# Patient Record
Sex: FEMALE | Race: WHITE | NOT HISPANIC OR LATINO | Employment: UNEMPLOYED | URBAN - NONMETROPOLITAN AREA
[De-identification: names, ages, dates, MRNs, and addresses within clinical notes are randomized per-mention and may not be internally consistent; named-entity substitution may affect disease eponyms.]

---

## 2020-02-13 ENCOUNTER — OFFICE VISIT (OUTPATIENT)
Dept: BARIATRICS/WEIGHT MGMT | Facility: CLINIC | Age: 25
End: 2020-02-13

## 2020-02-13 ENCOUNTER — OFFICE VISIT (OUTPATIENT)
Dept: BARIATRICS/WEIGHT MGMT | Facility: HOSPITAL | Age: 25
End: 2020-02-13

## 2020-02-13 VITALS
BODY MASS INDEX: 44.82 KG/M2 | OXYGEN SATURATION: 98 % | HEIGHT: 65 IN | SYSTOLIC BLOOD PRESSURE: 119 MMHG | TEMPERATURE: 97.8 F | WEIGHT: 269 LBS | DIASTOLIC BLOOD PRESSURE: 76 MMHG | HEART RATE: 86 BPM

## 2020-02-13 DIAGNOSIS — I10 ESSENTIAL HYPERTENSION: ICD-10-CM

## 2020-02-13 DIAGNOSIS — E66.01 CLASS 3 SEVERE OBESITY DUE TO EXCESS CALORIES WITH SERIOUS COMORBIDITY AND BODY MASS INDEX (BMI) OF 40.0 TO 44.9 IN ADULT (HCC): Primary | ICD-10-CM

## 2020-02-13 PROBLEM — E66.813 CLASS 3 SEVERE OBESITY DUE TO EXCESS CALORIES WITH SERIOUS COMORBIDITY AND BODY MASS INDEX (BMI) OF 40.0 TO 44.9 IN ADULT: Status: ACTIVE | Noted: 2020-02-13

## 2020-02-13 PROCEDURE — 99204 OFFICE O/P NEW MOD 45 MIN: CPT | Performed by: SURGERY

## 2020-02-13 NOTE — PROGRESS NOTES
"Nutrition Services    Patient Name:  Katrin Friedman  YOB: 1995  MRN: [id number]  Admit Date:  (Not on file)    NUTRITION BARIATRIC/[facility] NOTE     Visit 1  Initial Assessment     Anthropometrics   Height: 65.25 in  Weight: 269 lbs  BMI: 44.4    Nutrition Recall  Eating _3_____ meals daily   Snacking-in between  Limited sweet intake  Drinking carbonated beverages- 5 to 7 Sprites per day  Drinking less than 64 fluid ounces-none    Education    Goal Setting and Information Packet  4 meal per day diet plan  4 meal per day sample menu  \"Perfect Protein, Fiber in Foods, and Reducing Fat\"  Reinforce Nutritional Needs for Surgery  Reinforce Nutritional Needs for [facility]    Nutrition Goals   Eat ___4__ meals per day with protein  Eat protein first at meals  Protein goal: 65gms   discussed protein guidelines for shakes and bar  Eliminate snacks  Healthier food choices  Portion control / Use smaller plate or measuring cup   Decrease soda intake to-5 or less per day this next month  Increase fluid intake to 64 ounces per day        Electronically signed by:  Annmarie Multani  02/13/20 2:33 PM   "

## 2020-02-13 NOTE — PROGRESS NOTES
Patient Care Team:  Umang Fuentes APRN as PCP - General (Nurse Practitioner)    Reason for Visit:  Surgical Weight loss    Subjective     Patient is a 24 y.o. female presents with morbid obesity and her Body mass index is 44.42 kg/m².     She is here for discussion of surgical weight loss options.  She stated she has been with the disease of obesity for year(s).  She stated she suffers from hypertension and morbid obesity due to her weight gain.  She stated that weight loss helps alleviate these symptoms.   She stated that she has tried multiple diet regimens including weight watchers, the Atkins diet, counting calories and medications such as phentermine to help with weight loss.  She stated that she has attempted these conservative methods for weight loss without maintaining long term success.  Today she would like to discuss surgical weight loss options such as the Laparoscopic Sleeve Gastrectomy or the Laparoscopic R - Y Gastric Bypass.     Review of Systems  General ROS: positive for  - weight gain  Psychological ROS: positive for - depression  Ophthalmic ROS: negative  ENT ROS: negative  Endocrine ROS: positive for - polydipsia/polyuria  Respiratory ROS: no cough, shortness of breath, or wheezing  Cardiovascular ROS: no chest pain or dyspnea on exertion  Gastrointestinal ROS: no abdominal pain, change in bowel habits, or black or bloody stools  Genito-Urinary ROS: no dysuria, trouble voiding, or hematuria  Musculoskeletal ROS: positive for - joint pain    History  Past Medical History:   Diagnosis Date   • Anxiety    • Hypertension    • Joint pain    • Migraines    • Pre-diabetes      Past Surgical History:   Procedure Laterality Date   •  SECTION  2017    x1   • HYSTERECTOMY  2018    open     Family History   Problem Relation Age of Onset   • Asthma Mother    • COPD Mother    • Heart disease Mother    • Hypertension Mother    • Stroke Mother    • Obesity Mother    • Diabetes Father    • Heart  disease Father    • Hypertension Father    • Stroke Father      Social History     Tobacco Use   • Smoking status: Current Every Day Smoker     Packs/day: 0.50   • Smokeless tobacco: Never Used   Substance Use Topics   • Alcohol use: Yes     Comment: once in a blue moon   • Drug use: Never       (Not in a hospital admission)  Allergies:  Patient has no known allergies.      Current Outpatient Medications:   •  busPIRone HCl (BUSPAR PO), Take  by mouth 3 (Three) Times a Day., Disp: , Rfl:   •  Calcium Carbonate-Vitamin D (CALCIUM 600+D PO), Take  by mouth., Disp: , Rfl:   •  LISINOPRIL PO, Take  by mouth., Disp: , Rfl:   •  Sertraline HCl (ZOLOFT PO), Take  by mouth 2 (Two) Times a Day., Disp: , Rfl:     Objective     Vital Signs  Temp:  [97.8 °F (36.6 °C)] 97.8 °F (36.6 °C)  Heart Rate:  [86] 86  BP: (119)/(76) 119/76  Body mass index is 44.42 kg/m².      02/13/20  1302   Weight: 122 kg (269 lb)       General Appearance:  awake, alert, oriented, in no acute distress  Lungs:  Normal expansion.  Clear to auscultation.  No rales, rhonchi, or wheezing.  Heart:  Heart sounds are normal.  Regular rate and rhythm without murmur, gallop or rub.  Abdomen:  Soft, non-tender, normal bowel sounds; no bruits, organomegaly or masses.  Abnormal shape: obese  Extremities: Extremities warm to touch, pink, with no edema.      Results Review:   None        Assessment/Plan   Encounter Diagnoses   Name Primary?   • Class 3 severe obesity due to excess calories with serious comorbidity and body mass index (BMI) of 40.0 to 44.9 in adult (CMS/HCC) Yes   • Essential hypertension        I believe this patient will be a good candidate for weight loss surgery.    She has chosen laparoscopic sleeve gastrectomy. I agree with this decision.  I have discussed the Dorinda - Y Gastric Bypass, laparoscopic sleeve gastrectomy and the Laparoscopic Gastric Band procedures to provide the alternatives which also includes non surgical weight loss options as  "well.  We discussed the benefits of the surgeries including the benefit of weight loss and the possible reversal of co-morbid conditions associated with morbid obesity. I explained to her that prior to making a definitive decision on the type of surgery she will require a study of the upper GI system.  I explained to her why the EGD is recommended.  She has been provided a structured dietary regimen based off of her behavior.  I discussed with the patient the etiology of the disease of obesity and the potential comorbid conditions associated with this disease.  She was instructed to follow the dietary regimen and follow-up with our program in 1 month's time with any additional questions as they may arise during this time.  We emphasized on focusing on proteins and meals high in fiber as well as adequate hydration that exceed 64 ounces of water daily.  The patient reports that her hypertension has remained stable on his current treatment regimen.  Anticipate improvement if not reversal of these comorbid conditions with reversal of her morbid obesity.  I explained that I anticipate the patient to lose 6 pounds prior to her next monthly visit.  I have also explained that they need to record or document when they are going to have the \"cheat day\".    I discussed the patient's findings and my recommendations with patient.     I have also recommended that she obtain completion of medically supervised weight loss program as well as psych and cardiac risk assessment prior to surgery consideration.    Dr. Charlie Escamilla MD FACS    02/13/20  1:46 PM  Patient Care Team:  Umang Fuentes APRN as PCP - General (Nurse Practitioner)    "

## 2020-03-11 ENCOUNTER — OFFICE VISIT (OUTPATIENT)
Dept: BARIATRICS/WEIGHT MGMT | Facility: CLINIC | Age: 25
End: 2020-03-11

## 2020-03-11 VITALS
TEMPERATURE: 98.1 F | OXYGEN SATURATION: 98 % | BODY MASS INDEX: 44.85 KG/M2 | HEIGHT: 65 IN | HEART RATE: 79 BPM | SYSTOLIC BLOOD PRESSURE: 111 MMHG | WEIGHT: 269.2 LBS | DIASTOLIC BLOOD PRESSURE: 78 MMHG

## 2020-03-11 DIAGNOSIS — E66.01 CLASS 3 SEVERE OBESITY DUE TO EXCESS CALORIES WITH SERIOUS COMORBIDITY AND BODY MASS INDEX (BMI) OF 40.0 TO 44.9 IN ADULT (HCC): Primary | ICD-10-CM

## 2020-03-11 DIAGNOSIS — F17.210 TOBACCO DEPENDENCE DUE TO CIGARETTES: ICD-10-CM

## 2020-03-11 DIAGNOSIS — Z71.6 ENCOUNTER FOR TOBACCO USE CESSATION COUNSELING: ICD-10-CM

## 2020-03-11 DIAGNOSIS — Z01.818 ENCOUNTER FOR OTHER PREPROCEDURAL EXAMINATION: ICD-10-CM

## 2020-03-11 DIAGNOSIS — I10 ESSENTIAL HYPERTENSION: ICD-10-CM

## 2020-03-11 PROCEDURE — 99406 BEHAV CHNG SMOKING 3-10 MIN: CPT | Performed by: NURSE PRACTITIONER

## 2020-03-11 PROCEDURE — 99214 OFFICE O/P EST MOD 30 MIN: CPT | Performed by: NURSE PRACTITIONER

## 2020-03-11 NOTE — PROGRESS NOTES
Patient Care Team:  Umang Fuentes APRN as PCP - General (Nurse Practitioner)    Reason for Visit: Surgical Weight Loss    Subjective        Katrin Friedman is a 24 y.o. year old female who is here for follow-up and continued medical management of morbid obesity. Her current Body mass index is 44.45 kg/m². She states she suffers from high blood pressure and morbid obesity due to weight gain. She is currently following the 4 meals/day diet prescription. Katrin Friedman previously agreed to incorporate the prescription as provided, while also increasing physical exercise. Patient states she has been successful at eating 3 meals/day, which is up from 1-2 meals, and getting adequate water intake. She has struggles with eating 4 meals/day and eating carbohydrates after lunches. She has been exercising by walking for 30 minutes twice a week. Katrin Friedman also states she has been drinking 64 ounces of water and getting 30 grams of protein intake per day. She has maintained weight since her last visit.    Review of Systems  Negative except the below listed  Musculoskeletal ROS: positive for - back pain    History  Past Medical History:   Diagnosis Date   • Anxiety    • Hypertension    • Joint pain    • Migraines    • Pre-diabetes      Past Surgical History:   Procedure Laterality Date   •  SECTION  2017    x1   • HYSTERECTOMY  2018    open     Family History   Problem Relation Age of Onset   • Asthma Mother    • COPD Mother    • Heart disease Mother    • Hypertension Mother    • Stroke Mother    • Obesity Mother    • Diabetes Father    • Heart disease Father    • Hypertension Father    • Stroke Father      Social History     Tobacco Use   • Smoking status: Current Every Day Smoker     Packs/day: 0.50   • Smokeless tobacco: Never Used   Substance Use Topics   • Alcohol use: Yes     Comment: once in a blue moon   • Drug use: Never       (Not in a hospital admission)  Allergies:  Patient has no known  allergies.      Current Outpatient Medications:   •  busPIRone HCl (BUSPAR PO), Take  by mouth 3 (Three) Times a Day., Disp: , Rfl:   •  Calcium Carbonate-Vitamin D (CALCIUM 600+D PO), Take  by mouth., Disp: , Rfl:   •  LISINOPRIL PO, Take  by mouth., Disp: , Rfl:   •  Sertraline HCl (ZOLOFT PO), Take  by mouth 2 (Two) Times a Day., Disp: , Rfl:     Objective     Vital Signs  Temp:  [98.1 °F (36.7 °C)] 98.1 °F (36.7 °C)  Heart Rate:  [79] 79  BP: (111)/(78) 111/78  Body mass index is 44.45 kg/m².      03/11/20  1327   Weight: 122 kg (269 lb 3.2 oz)       Physical Exam:  HEENT: extra ocular movement intact  Respiratory:appears well, vitals normal, no respiratory distress, acyanotic, normal RR, chest clear, no wheezing, crepitations, rhonchi, normal symmetric air entry  Cardiovascular: regular rate and rhythm  GI: Soft, non-tender, normal bowel sounds; no bruits, organomegaly or masses. Abnormal shape: Obese  Musculoskeletal: inspection - no abnormality, range of motion normal  Neurologic: alert, oriented, normal speech, no focal findings or movement disorder noted       Results Review:   None        Assessment/Plan   Encounter Diagnoses   Name Primary?   • Class 3 severe obesity due to excess calories with serious comorbidity and body mass index (BMI) of 40.0 to 44.9 in adult (CMS/HCC) Yes   • Essential hypertension    • Encounter for other preprocedural examination    • Encounter for tobacco use cessation counseling    • Tobacco dependence due to cigarettes          1. Katrin Friedman was seen today for follow-up, obesity, nutrition counseling and weight loss. She has maintained weight since her last visit, making her Body mass index is 44.45 kg/m².. Today we discussed consistency with healthy changes in lifestyle, diet, and exercise for long term success. Katrin Friedman had received handouts to her explaining the recommendation on portion sizes/appetite control/reading nutrition labels. Intensive behavioral therapy for  obesity was done today as well.    Dr. Escamilla and I believe this patient will be a good candidate for weight loss surgery.  He has discussed the Dorinda - Y Gastric Bypass, laparoscopic sleeve gastrectomy and the Laparoscopic Gastric Band procedures with the patient.  We discussed the benefits of the surgeries including the benefit of weight loss and the possible reversal of co-morbid conditions associated with morbid obesity. We have explained to the patient that prior to making a definitive decision on the type of surgery she will require an esophagogastroduodenoscopy with biopsies to assess for any contraindications for surgical weight loss.  The alternatives  include not doing anything, or pursuing an UGI series which only offers a diagnosis with potential less accuracy compared to EGD. The benefits of the EGD such as identifying the pathology and anatomy of the upper GI system and the complications and risks of the procedure.    The risk of the endoscopy were discussed in detail. We discussed the risk of perforation (one out of 3195-4002, riskier with dilation), bleeding (one out of 500), and the rare risks of infection, adverse reaction to anesthesia, respiratory failure, cardiac failure including MI and adverse reaction to medications, etc. We discussed consequences that could occur if a risk were to develop such as the need for hospitalization, blood transfusion, surgical intervention, medications, pain, disability and death. The patient verbalizes understanding and agrees to proceed. such as bleeding, perforation, swallowing difficulties and gas bloat can occur after this procedure. Upon completion of our discussion and addressing and answering her questions to her satisfation, informed consent was obtained.  She will be scheduled accordingly for the esophagogastroduodenoscopy procedure.    Goals for this month are: follow the meal prescription as provided focusing on eating 4 meals/day with vegetables at every  meal, eliminating carbohydrates after lunch, and getting adequate water and protein intake. Patient encouraged to call with questions and/or struggles as they may arise prior to next scheduled appointment.    2. Current comorbid condition of hypertension associated with her morbid obesity is reported to be stable on her current treatment regimen and medications. We anticipate the comorbid condition to improve as we address her morbid obesity.    3. Pre-procedural Examination - EGD, EKG, and psychology evaluation ordered today.    4. Katrin Friedman  reports that she has been smoking. She has been smoking about 0.50 packs per day. She has never used smokeless tobacco.. I have educated her on the risk of diseases from using tobacco products such as cancer, COPD and heart diease. I advised her to quit and she is willing to quit. We have discussed the following method/s for tobacco cessation:  Education Material Counseling.  Together we have set a quit date for 2 months.  She will follow up with me in 1 month or sooner to check on her progress. I spent 4 minutes counseling the patient.    Follow up in 1 month for a weight recheck.    STEFANIE Rivas    03/11/20  14:13  Patient Care Team:  Umang Fuentes APRN as PCP - General (Nurse Practitioner)

## 2020-03-12 ENCOUNTER — TELEPHONE (OUTPATIENT)
Dept: BARIATRICS/WEIGHT MGMT | Facility: CLINIC | Age: 25
End: 2020-03-12

## 2020-03-12 NOTE — TELEPHONE ENCOUNTER
Left message for patient to call me back. I was going to move her appointment from April 8 to April 10 when the new patient boot camp is so she didn't have to make two trips.

## 2020-04-03 ENCOUNTER — TELEPHONE (OUTPATIENT)
Dept: BARIATRICS/WEIGHT MGMT | Facility: CLINIC | Age: 25
End: 2020-04-03

## 2020-04-07 ENCOUNTER — TELEPHONE (OUTPATIENT)
Dept: BARIATRICS/WEIGHT MGMT | Facility: CLINIC | Age: 25
End: 2020-04-07

## 2020-04-08 ENCOUNTER — TELEPHONE (OUTPATIENT)
Dept: BARIATRICS/WEIGHT MGMT | Facility: CLINIC | Age: 25
End: 2020-04-08

## 2020-04-08 NOTE — TELEPHONE ENCOUNTER
The patients phone has calling restrictions on it now and I am unable to contact her. I sent her an email.

## 2020-04-15 ENCOUNTER — TELEPHONE (OUTPATIENT)
Dept: BARIATRICS/WEIGHT MGMT | Facility: CLINIC | Age: 25
End: 2020-04-15

## 2020-04-16 ENCOUNTER — TELEPHONE (OUTPATIENT)
Dept: BARIATRICS/WEIGHT MGMT | Facility: CLINIC | Age: 25
End: 2020-04-16

## 2020-04-16 NOTE — TELEPHONE ENCOUNTER
Attempted to reach patient multiple times for appointment today but was unsuccessful. We will continue to attempt to contact her and reschedule her appointment if necessary.    Donita Farfan, APRN

## 2020-04-23 ENCOUNTER — TELEMEDICINE (OUTPATIENT)
Dept: BARIATRICS/WEIGHT MGMT | Facility: CLINIC | Age: 25
End: 2020-04-23

## 2020-04-23 VITALS — BODY MASS INDEX: 44.55 KG/M2 | WEIGHT: 267.4 LBS | HEIGHT: 65 IN

## 2020-04-23 DIAGNOSIS — I10 ESSENTIAL HYPERTENSION: ICD-10-CM

## 2020-04-23 DIAGNOSIS — E66.01 CLASS 3 SEVERE OBESITY DUE TO EXCESS CALORIES WITH SERIOUS COMORBIDITY AND BODY MASS INDEX (BMI) OF 40.0 TO 44.9 IN ADULT (HCC): Primary | ICD-10-CM

## 2020-04-23 PROCEDURE — 99422 OL DIG E/M SVC 11-20 MIN: CPT | Performed by: NURSE PRACTITIONER

## 2020-04-23 NOTE — PROGRESS NOTES
Patient Care Team:  Umang Fuentes APRN as PCP - General (Nurse Practitioner)    Reason for Visit: Surgical Weight Loss    Type of Visit: Video Visit  Provider Location: [facility]  Patient Location: Home      Subjective        Katrin Friedman is a 24 y.o. year old female who is attending a video visit for follow-up and continued medical management of morbid obesity. Her current Body mass index is 44.16 kg/m². She states she suffers from high blood pressure and morbid obesity due to weight gain. She is currently following the 4 meals/day diet prescription. Katrin Friedman previously agreed to incorporate the prescription as provided, while also increasing physical exercise. Patient states she has been successful at eating 4 meals/day, getting vegetables with every meal, limiting carbohydrates after lunch, and getting adequate water intake. She struggles with cravings for sweets, which she curbs with fruit in the morning. She has been exercising by walking once a week for 40-45 minutes. Katrin Friedman also states she has been drinking 112+ ounces of water and getting 50-60 grams of protein intake per day. She has lost 2 lbs of weight since her last visit.    Review of Systems  Negative except the following  General ROS: positive for  - sleep disturbance    History  Past Medical History:   Diagnosis Date   • Anxiety    • Hypertension    • Joint pain    • Migraines    • Pre-diabetes      Past Surgical History:   Procedure Laterality Date   •  SECTION  2017    x1   • HYSTERECTOMY  2018    open     Family History   Problem Relation Age of Onset   • Asthma Mother    • COPD Mother    • Heart disease Mother    • Hypertension Mother    • Stroke Mother    • Obesity Mother    • Diabetes Father    • Heart disease Father    • Hypertension Father    • Stroke Father      Social History     Tobacco Use   • Smoking status: Current Every Day Smoker     Packs/day: 0.50   • Smokeless tobacco: Never Used   Substance  Use Topics   • Alcohol use: Yes     Comment: once in a blue moon   • Drug use: Never       (Not in a hospital admission)  Allergies:  Patient has no known allergies.      Current Outpatient Medications:   •  busPIRone HCl (BUSPAR PO), Take  by mouth 3 (Three) Times a Day., Disp: , Rfl:   •  Calcium Carbonate-Vitamin D (CALCIUM 600+D PO), Take  by mouth., Disp: , Rfl:   •  LISINOPRIL PO, Take  by mouth., Disp: , Rfl:   •  Sertraline HCl (ZOLOFT PO), Take  by mouth 2 (Two) Times a Day., Disp: , Rfl:     Objective     Vital Signs     Body mass index is 44.16 kg/m².      04/23/20  1342   Weight: 121 kg (267 lb 6.4 oz)       Physical Exam:  HEENT: extra ocular movement intact  Respiratory:appears well, no respiratory distress, acyanotic, normal RR  GI: Abnormal shape: obese  Musculoskeletal: range of motion normal  Neurologic: alert, oriented, normal speech, no focal findings or movement disorder noted       Results Review:   None        Assessment/Plan   Encounter Diagnoses   Name Primary?   • Class 3 severe obesity due to excess calories with serious comorbidity and body mass index (BMI) of 40.0 to 44.9 in adult (CMS/[facility]) Yes   • Essential hypertension          1. Katrin Friedman was seen today via video visit for follow-up, obesity, nutrition counseling and weight loss. She has lost 2 lbs of weight since her last visit, making her Body mass index is 44.16 kg/m².. Today we discussed consistency with healthy changes in lifestyle, diet, and exercise for long term success. Katrin Friedman had received handouts to her explaining the recommendation on portion sizes/appetite control/reading nutrition labels. Intensive behavioral therapy for obesity was done today as well.    Goals for this month are: continue to work towards following the meal prescription as provided focusing on eating 4 meals/day with vegetables at every meal, eliminating carbohydrates after lunch, and getting adequate water and protein intake. Patient  encouraged to call with questions and/or struggles as they may arise prior to next scheduled appointment.    2. Current comorbid condition of hypertension associated with her morbid obesity is reported to be stable on her current treatment regimen and medications. We anticipate the comorbid condition to improve as we address her morbid obesity.    A total of 15 minutes was spent face to face with this patient on video and over half of the time was spent on counseling and coordination of care for the disease of obesity. We specifically reviewed the dietary prescription and I made recommendations toward increasing exercise as tolerated as well as focusing on training their behavior toward storing less.    Follow up in 1 month for a weight recheck.    STEFANIE Rivas    04/23/20  14:43  Patient Care Team:  Umang Fuentes APRN as PCP - General (Nurse Practitioner)

## 2020-05-20 DIAGNOSIS — Z01.818 ENCOUNTER FOR OTHER PREPROCEDURAL EXAMINATION: Primary | ICD-10-CM

## 2020-05-20 DIAGNOSIS — E66.01 CLASS 3 SEVERE OBESITY DUE TO EXCESS CALORIES WITH SERIOUS COMORBIDITY AND BODY MASS INDEX (BMI) OF 40.0 TO 44.9 IN ADULT (HCC): ICD-10-CM

## 2020-05-21 ENCOUNTER — TELEMEDICINE (OUTPATIENT)
Dept: BARIATRICS/WEIGHT MGMT | Facility: CLINIC | Age: 25
End: 2020-05-21

## 2020-05-21 VITALS — BODY MASS INDEX: 44.48 KG/M2 | HEIGHT: 65 IN | WEIGHT: 267 LBS

## 2020-05-21 DIAGNOSIS — F17.210 TOBACCO DEPENDENCE DUE TO CIGARETTES: ICD-10-CM

## 2020-05-21 DIAGNOSIS — Z71.6 ENCOUNTER FOR TOBACCO USE CESSATION COUNSELING: ICD-10-CM

## 2020-05-21 DIAGNOSIS — Z01.818 ENCOUNTER FOR OTHER PREPROCEDURAL EXAMINATION: ICD-10-CM

## 2020-05-21 DIAGNOSIS — I10 ESSENTIAL HYPERTENSION: ICD-10-CM

## 2020-05-21 DIAGNOSIS — E66.01 CLASS 3 SEVERE OBESITY DUE TO EXCESS CALORIES WITH SERIOUS COMORBIDITY AND BODY MASS INDEX (BMI) OF 40.0 TO 44.9 IN ADULT (HCC): Primary | ICD-10-CM

## 2020-05-21 PROCEDURE — 99422 OL DIG E/M SVC 11-20 MIN: CPT | Performed by: NURSE PRACTITIONER

## 2020-05-21 NOTE — PROGRESS NOTES
Patient Care Team:  Umang Feuntes APRN as PCP - General (Nurse Practitioner)    Reason for Visit: Surgical Weight Loss (Visit 4)    Type of Visit: Video Visit  Provider Location: [facility] [place] [facility]  Patient Location: Home    Subjective      Katrin Friedman is a 24 y.o. year old female who is attending a video visit for follow-up and continued medical management of morbid obesity. Her current Body mass index is 44.09 kg/m². She states she suffers from high blood pressure and morbid obesity due to weight gain. She is currently following the 4 meals/day diet prescription. Katrin Friedman previously agreed to incorporate the prescription as provided, while also increasing physical exercise. Patient states she has been successful at eating 4 meals/day but continues to struggles with cravings for sweets and admits to giving in at times as well as having carbohydrates after lunch. She has been exercising by walking once a week for 40-45 minutes. Katrin Friedman also states she has been drinking 64 ounces of water and getting 60 grams of protein intake per day. She has maintaining weight since her last visit.    Review of Systems   Constitutional: Negative.    HENT: Negative.    Respiratory: Negative.    Cardiovascular: Positive for leg swelling.   Gastrointestinal: Negative.    Endocrine: Negative.    Genitourinary: Negative.    Musculoskeletal: Negative.    Skin: Negative.    Neurological: Negative.    Hematological: Negative.    Psychiatric/Behavioral: Negative.      History  Past Medical History:   Diagnosis Date   • Anxiety    • Hypertension    • Joint pain    • Migraines    • Pre-diabetes      Past Surgical History:   Procedure Laterality Date   •  SECTION  2017    x1   • HYSTERECTOMY  2018    open     Family History   Problem Relation Age of Onset   • Asthma Mother    • COPD Mother    • Heart disease Mother    • Hypertension Mother    • Stroke Mother    • Obesity Mother    • Diabetes Father    • Heart  disease Father    • Hypertension Father    • Stroke Father      Social History     Tobacco Use   • Smoking status: Current Every Day Smoker     Packs/day: 0.50   • Smokeless tobacco: Never Used   Substance Use Topics   • Alcohol use: Yes     Comment: once in a blue moon   • Drug use: Never       (Not in a hospital admission)  Allergies:  Patient has no known allergies.      Current Outpatient Medications:   •  busPIRone HCl (BUSPAR PO), Take  by mouth 3 (Three) Times a Day., Disp: , Rfl:   •  Calcium Carbonate-Vitamin D (CALCIUM 600+D PO), Take  by mouth., Disp: , Rfl:   •  LISINOPRIL PO, Take  by mouth., Disp: , Rfl:   •  Sertraline HCl (ZOLOFT PO), Take  by mouth 2 (Two) Times a Day., Disp: , Rfl:     Objective     Vital Signs     Body mass index is 44.09 kg/m².      05/21/20  1303   Weight: 121 kg (267 lb)       Physical Exam:  HEENT: extra ocular movement intact  Respiratory:appears well, no respiratory distress, acyanotic, normal RR  GI: Abnormal shape: obese  Musculoskeletal: range of motion normal  Neurologic: alert, oriented, normal speech, no focal findings or movement disorder noted       Results Review:   None      Assessment/Plan   Encounter Diagnoses   Name Primary?   • Class 3 severe obesity due to excess calories with serious comorbidity and body mass index (BMI) of 40.0 to 44.9 in adult (CMS/HCC) Yes   • Essential hypertension    • Encounter for other preprocedural examination    • Tobacco dependence due to cigarettes    • Encounter for tobacco use cessation counseling          1. Katrin Friedman was seen today via video visit for follow-up, obesity, nutrition counseling and weight loss. She has maintained weight since her last visit, making her Body mass index is 44.09 kg/m².. Today we discussed consistency with healthy changes in lifestyle, diet, and exercise for long term success. Katrin Friedman had received handouts to her explaining the recommendation on portion sizes/appetite control/reading  nutrition labels. Intensive behavioral therapy for obesity was done today as well. She will also talking with our dietitian today.    Goals for this month are: get EKG, contact psychology office for appointment, and return to making changes towards following the meal prescription as provided focusing on eating 4 meals/day with vegetables at every meal, eliminating carbohydrates after lunch, and getting adequate water and protein intake. Buy frozen or canned if needed to help with affording food. Patient encouraged to call with questions and/or struggles as they may arise prior to next scheduled appointment.    2. Current comorbid condition of hypertension associated with her morbid obesity is reported to be stable on her current treatment regimen and medications. We anticipate the comorbid condition to improve as we address her morbid obesity.     3. Pre-procedural Examination - EKG ordered today, last order .    4. Katrin Friedman  reports that she has been smoking. She has been smoking about 0.50 packs per day. She has never used smokeless tobacco.. I have educated her on the risk of diseases from using tobacco products such as cancer, COPD and heart diease. I advised her to quit and she is willing to quit. We have discussed the following method/s for tobacco cessation:  Counseling OTC Cessation Products.  She has switched to vapeing to help decrease her nicotine intake gradually. Together we have set a quit date for 3 months.  She will follow up with me in 1 month or sooner to check on her progress. I spent 4 minutes counseling the patient.    A total of 18 minutes was spent face to face with this patient on video and over half of the time was spent on counseling and coordination of care for the disease of obesity. We specifically reviewed the dietary prescription and I made recommendations toward increasing exercise as tolerated as well as focusing on training their behavior toward storing less.    Pre-op  testing:    EGD - Ordered, but not yet completed  H. Pylori - Ordered, but not yet completed   H. Pylori Stool -  N/A    Psychological Evaluation - Ordered, but not yet completed    EKG - Ordered, but not yet completed    Cardiology - If EKG abnormal     TSH - Needed, but not yet ordered  Nicotine - Needed, but not yet ordered    Pulmonary Clearance - N/A   Drug Tests - N/A    Dietitian - Completed     Follow up in 1 month for a weight recheck.    STEFANIE Rivas    05/21/20  13:25  Patient Care Team:  Umang Fuentes APRN as PCP - General (Nurse Practitioner)

## 2020-05-27 PROBLEM — Z01.818 ENCOUNTER FOR OTHER PREPROCEDURAL EXAMINATION: Status: ACTIVE | Noted: 2020-05-27

## 2020-06-05 ENCOUNTER — TELEPHONE (OUTPATIENT)
Dept: BARIATRICS/WEIGHT MGMT | Facility: CLINIC | Age: 25
End: 2020-06-05

## 2020-06-05 DIAGNOSIS — Z71.6 ENCOUNTER FOR TOBACCO USE CESSATION COUNSELING: ICD-10-CM

## 2020-06-05 DIAGNOSIS — E66.01 CLASS 3 SEVERE OBESITY DUE TO EXCESS CALORIES WITH SERIOUS COMORBIDITY AND BODY MASS INDEX (BMI) OF 40.0 TO 44.9 IN ADULT (HCC): ICD-10-CM

## 2020-06-05 DIAGNOSIS — Z01.818 ENCOUNTER FOR OTHER PREPROCEDURAL EXAMINATION: Primary | ICD-10-CM

## 2020-06-05 NOTE — TELEPHONE ENCOUNTER
Patient called to say that she stopped smoking on June 1st and inquired about when she should get her nicotine test. I recommended sometime after July 1st. She was agreeable. STEFANIE Luther was notified and she will place the order.

## 2020-06-11 ENCOUNTER — TRANSCRIBE ORDERS (OUTPATIENT)
Dept: ADMINISTRATIVE | Facility: HOSPITAL | Age: 25
End: 2020-06-11

## 2020-06-11 DIAGNOSIS — Z01.818 PRE-OP TESTING: Primary | ICD-10-CM

## 2020-06-16 ENCOUNTER — LAB (OUTPATIENT)
Dept: LAB | Facility: HOSPITAL | Age: 25
End: 2020-06-16

## 2020-06-16 PROCEDURE — U0003 INFECTIOUS AGENT DETECTION BY NUCLEIC ACID (DNA OR RNA); SEVERE ACUTE RESPIRATORY SYNDROME CORONAVIRUS 2 (SARS-COV-2) (CORONAVIRUS DISEASE [COVID-19]), AMPLIFIED PROBE TECHNIQUE, MAKING USE OF HIGH THROUGHPUT TECHNOLOGIES AS DESCRIBED BY CMS-2020-01-R: HCPCS | Performed by: SURGERY

## 2020-06-17 ENCOUNTER — TELEPHONE (OUTPATIENT)
Dept: BARIATRICS/WEIGHT MGMT | Facility: CLINIC | Age: 25
End: 2020-06-17

## 2020-06-17 LAB
COVID LABCORP PRIORITY: NORMAL
SARS-COV-2 RNA RESP QL NAA+PROBE: NOT DETECTED

## 2020-06-17 NOTE — TELEPHONE ENCOUNTER
Patient notified of their EGD procedure with Dr Escamilla     BA Process-EGD Reminder/Instructions       Called to remind the patient of his procedure with Dr Escamilla tomorrow 06/19/2020 EGD.      Instructions:     1) Eat normal the day before your procedure until 8 p.m. in the evening.    2) Beginning at 8pm clear liquids only-no Red or Pink in Color   3) Nothing to eat or drink after midnight.  4) Bring a list of medications.   5) Advised that they must bring a  as that IV sedation is being used.

## 2020-06-19 ENCOUNTER — HOSPITAL ENCOUNTER (OUTPATIENT)
Facility: HOSPITAL | Age: 25
Setting detail: HOSPITAL OUTPATIENT SURGERY
Discharge: HOME | End: 2020-06-19
Attending: SURGERY | Admitting: SURGERY

## 2020-06-19 ENCOUNTER — ANESTHESIA EVENT (OUTPATIENT)
Dept: GASTROENTEROLOGY | Facility: HOSPITAL | Age: 25
End: 2020-06-19

## 2020-06-19 ENCOUNTER — ANESTHESIA (OUTPATIENT)
Dept: GASTROENTEROLOGY | Facility: HOSPITAL | Age: 25
End: 2020-06-19

## 2020-06-19 VITALS
WEIGHT: 268 LBS | DIASTOLIC BLOOD PRESSURE: 70 MMHG | OXYGEN SATURATION: 99 % | HEART RATE: 69 BPM | BODY MASS INDEX: 43.07 KG/M2 | HEIGHT: 66 IN | RESPIRATION RATE: 18 BRPM | SYSTOLIC BLOOD PRESSURE: 119 MMHG | TEMPERATURE: 97 F

## 2020-06-19 DIAGNOSIS — E66.01 CLASS 3 SEVERE OBESITY DUE TO EXCESS CALORIES WITH SERIOUS COMORBIDITY AND BODY MASS INDEX (BMI) OF 40.0 TO 44.9 IN ADULT (HCC): ICD-10-CM

## 2020-06-19 DIAGNOSIS — Z01.818 ENCOUNTER FOR OTHER PREPROCEDURAL EXAMINATION: ICD-10-CM

## 2020-06-19 PROBLEM — K20.90 ESOPHAGITIS: Status: ACTIVE | Noted: 2020-06-19

## 2020-06-19 PROCEDURE — 87081 CULTURE SCREEN ONLY: CPT | Performed by: SURGERY

## 2020-06-19 PROCEDURE — 88305 TISSUE EXAM BY PATHOLOGIST: CPT | Performed by: SURGERY

## 2020-06-19 PROCEDURE — 25010000002 PROPOFOL 10 MG/ML EMULSION: Performed by: NURSE ANESTHETIST, CERTIFIED REGISTERED

## 2020-06-19 PROCEDURE — 43239 EGD BIOPSY SINGLE/MULTIPLE: CPT | Performed by: SURGERY

## 2020-06-19 RX ORDER — SODIUM CHLORIDE 9 MG/ML
500 INJECTION, SOLUTION INTRAVENOUS CONTINUOUS PRN
Status: DISCONTINUED | OUTPATIENT
Start: 2020-06-19 | End: 2020-06-19 | Stop reason: HOSPADM

## 2020-06-19 RX ORDER — SODIUM CHLORIDE 0.9 % (FLUSH) 0.9 %
10 SYRINGE (ML) INJECTION AS NEEDED
Status: DISCONTINUED | OUTPATIENT
Start: 2020-06-19 | End: 2020-06-19 | Stop reason: HOSPADM

## 2020-06-19 RX ORDER — LIDOCAINE HYDROCHLORIDE 10 MG/ML
0.5 INJECTION, SOLUTION EPIDURAL; INFILTRATION; INTRACAUDAL; PERINEURAL ONCE AS NEEDED
Status: DISCONTINUED | OUTPATIENT
Start: 2020-06-19 | End: 2020-06-19 | Stop reason: HOSPADM

## 2020-06-19 RX ORDER — PROPOFOL 10 MG/ML
VIAL (ML) INTRAVENOUS AS NEEDED
Status: DISCONTINUED | OUTPATIENT
Start: 2020-06-19 | End: 2020-06-19 | Stop reason: SURG

## 2020-06-19 RX ORDER — OMEPRAZOLE 20 MG/1
20 CAPSULE, DELAYED RELEASE ORAL DAILY
Qty: 20 CAPSULE | Refills: 0 | Status: SHIPPED | OUTPATIENT
Start: 2020-06-19 | End: 2020-08-27 | Stop reason: SDUPTHER

## 2020-06-19 RX ADMIN — SODIUM CHLORIDE 500 ML: 9 INJECTION, SOLUTION INTRAVENOUS at 09:33

## 2020-06-19 RX ADMIN — PROPOFOL 50 MG: 10 INJECTION, EMULSION INTRAVENOUS at 09:58

## 2020-06-19 RX ADMIN — LIDOCAINE HYDROCHLORIDE 100 MG: 20 INJECTION, SOLUTION INTRAVENOUS at 09:55

## 2020-06-19 RX ADMIN — PROPOFOL 70 MG: 10 INJECTION, EMULSION INTRAVENOUS at 09:55

## 2020-06-19 RX ADMIN — PROPOFOL 50 MG: 10 INJECTION, EMULSION INTRAVENOUS at 09:56

## 2020-06-19 RX ADMIN — PROPOFOL 50 MG: 10 INJECTION, EMULSION INTRAVENOUS at 09:57

## 2020-06-19 NOTE — ANESTHESIA POSTPROCEDURE EVALUATION
Patient: Katrin Friedman    Procedure Summary     Date:  06/19/20 Room / Location:  [facility] ENDOSCOPY 6 / [facility] ENDOSCOPY    Anesthesia Start:  0952 Anesthesia Stop:  1002    Procedure:  ESOPHAGOGASTRODUODENOSCOPY WITH ANESTHESIA (N/A ) Diagnosis:       Class 3 severe obesity due to excess calories with serious comorbidity and body mass index (BMI) of 40.0 to 44.9 in adult (CMS/[facility])      Encounter for other preprocedural examination      (Class 3 severe obesity due to excess calories with serious comorbidity and body mass index (BMI) of 40.0 to 44.9 in adult (CMS/[facility]) [E66.01, Z68.41])      (Encounter for other preprocedural examination [Z01.818])    Surgeon:  Charlie Escamilla MD Provider:  Adam Escamilla CRNA    Anesthesia Type:  MAC ASA Status:  3          Anesthesia Type: MAC    Vitals  No vitals data found for the desired time range.          Post Anesthesia Care and Evaluation    Patient location during evaluation: PHASE II  Patient participation: complete - patient participated  Level of consciousness: awake  Pain score: 0  Pain management: adequate  Airway patency: patent  Anesthetic complications: No anesthetic complications  PONV Status: none  Cardiovascular status: acceptable  Respiratory status: acceptable  Hydration status: acceptable

## 2020-06-19 NOTE — ANESTHESIA PREPROCEDURE EVALUATION
Anesthesia Evaluation     Patient summary reviewed   no history of anesthetic complications:  NPO Solid Status: > 8 hours             Airway   Mallampati: II  TM distance: >3 FB  Neck ROM: full  Dental      Pulmonary    (+) a smoker Former,   Cardiovascular   Exercise tolerance: excellent (>7 METS)    (+) hypertension,       Neuro/Psych- negative ROS  GI/Hepatic/Renal/Endo    (+) morbid obesity,      Musculoskeletal     Abdominal    Substance History      OB/GYN          Other                        Anesthesia Plan    ASA 3     MAC       Anesthetic plan, all risks, benefits, and alternatives have been provided, discussed and informed consent has been obtained with: patient.

## 2020-06-20 LAB — UREASE TISS QL: NEGATIVE

## 2020-06-22 LAB
LAB AP CASE REPORT: NORMAL
PATH REPORT.FINAL DX SPEC: NORMAL
PATH REPORT.GROSS SPEC: NORMAL

## 2020-06-26 ENCOUNTER — LAB (OUTPATIENT)
Dept: LAB | Facility: HOSPITAL | Age: 25
End: 2020-06-26

## 2020-06-26 ENCOUNTER — HOSPITAL ENCOUNTER (OUTPATIENT)
Dept: CARDIOLOGY | Facility: HOSPITAL | Age: 25
Discharge: HOME | End: 2020-06-26
Admitting: NURSE PRACTITIONER

## 2020-06-26 DIAGNOSIS — E66.01 CLASS 3 SEVERE OBESITY DUE TO EXCESS CALORIES WITH SERIOUS COMORBIDITY AND BODY MASS INDEX (BMI) OF 40.0 TO 44.9 IN ADULT (HCC): ICD-10-CM

## 2020-06-26 DIAGNOSIS — Z01.818 ENCOUNTER FOR OTHER PREPROCEDURAL EXAMINATION: ICD-10-CM

## 2020-06-26 DIAGNOSIS — I10 ESSENTIAL HYPERTENSION: ICD-10-CM

## 2020-06-26 PROCEDURE — 36415 COLL VENOUS BLD VENIPUNCTURE: CPT

## 2020-06-26 PROCEDURE — 93005 ELECTROCARDIOGRAM TRACING: CPT | Performed by: NURSE PRACTITIONER

## 2020-06-26 PROCEDURE — 93010 ELECTROCARDIOGRAM REPORT: CPT | Performed by: INTERNAL MEDICINE

## 2020-06-26 PROCEDURE — 84443 ASSAY THYROID STIM HORMONE: CPT | Performed by: NURSE PRACTITIONER

## 2020-06-26 PROCEDURE — G0480 DRUG TEST DEF 1-7 CLASSES: HCPCS | Performed by: NURSE PRACTITIONER

## 2020-06-27 LAB — TSH SERPL DL<=0.05 MIU/L-ACNC: 1.18 UIU/ML (ref 0.27–4.2)

## 2020-06-29 ENCOUNTER — TELEPHONE (OUTPATIENT)
Dept: BARIATRICS/WEIGHT MGMT | Facility: CLINIC | Age: 25
End: 2020-06-29

## 2020-06-30 ENCOUNTER — TELEMEDICINE - AUDIO (OUTPATIENT)
Dept: BARIATRICS/WEIGHT MGMT | Facility: CLINIC | Age: 25
End: 2020-06-30

## 2020-06-30 VITALS — WEIGHT: 267.4 LBS | HEIGHT: 66 IN | BODY MASS INDEX: 42.97 KG/M2

## 2020-06-30 DIAGNOSIS — E66.01 CLASS 3 SEVERE OBESITY DUE TO EXCESS CALORIES WITH SERIOUS COMORBIDITY AND BODY MASS INDEX (BMI) OF 40.0 TO 44.9 IN ADULT (HCC): Primary | ICD-10-CM

## 2020-06-30 DIAGNOSIS — I10 ESSENTIAL HYPERTENSION: ICD-10-CM

## 2020-06-30 PROCEDURE — 99441 PR PHYS/QHP TELEPHONE EVALUATION 5-10 MIN: CPT | Performed by: NURSE PRACTITIONER

## 2020-06-30 NOTE — PROGRESS NOTES
Patient Care Team:  Umang Fuentes APRN as PCP - General (Nurse Practitioner)    Reason for Visit: Surgical Weight Loss (Visit 5)    Type of Visit: Telephone Visit  Provider Location: [facility] [place] [facility]  Patient Location: Home    Subjective      Katrin Friedman is a 25 y.o. year old female who is attending a telephone visit for follow-up and continued medical management of morbid obesity. Her current Body mass index is 43.16 kg/m². She states she suffers from high blood pressure and morbid obesity due to weight gain. She is currently following the 4 meals/day diet prescription. Katrin Friedman previously agreed to incorporate the prescription as provided, while also increasing physical exercise. Patient states she has been successful at eating 4 meals/day, getting vegetables with every meal, and limiting carbohydrate intake after lunch. She has continued to avoid sodas. She also states she stopped all nicotine use as of [place] first. She has not been exercising. Katrin Friedman also states she has been drinking 64 ounces of water and getting 60+ grams of protein intake per day. She has maintained weight since her last visit.    Review of Systems   Constitutional: Negative.    HENT: Negative.    Respiratory: Negative.    Cardiovascular: Negative.    Gastrointestinal: Negative.    Genitourinary: Negative.    Musculoskeletal: Negative.    Skin: Negative.    Neurological: Negative.    Hematological: Negative.    Psychiatric/Behavioral: Negative.      History  Past Medical History:   Diagnosis Date   • Anxiety    • Hypertension    • Joint pain    • Migraines    • Pre-diabetes      Past Surgical History:   Procedure Laterality Date   •  SECTION  2017    x1   • ENDOSCOPY N/A 2020    Procedure: ESOPHAGOGASTRODUODENOSCOPY WITH ANESTHESIA;  Surgeon: Charlie Escamilla MD;  Location: [facility] ENDOSCOPY;  Service: General;  Laterality: N/A;  pre op: pre op scope   post op: esohpagitis   PCP: Genie Caraballo MD   •  HYSTERECTOMY  2018    open     Family History   Problem Relation Age of Onset   • Asthma Mother    • COPD Mother    • Heart disease Mother    • Hypertension Mother    • Stroke Mother    • Obesity Mother    • Diabetes Father    • Heart disease Father    • Hypertension Father    • Stroke Father      Social History     Tobacco Use   • Smoking status: Former Smoker     Packs/day: 0.50     Last attempt to quit: 2020     Years since quittin.0   • Smokeless tobacco: Never Used   Substance Use Topics   • Alcohol use: Yes     Comment: once in a blue moon   • Drug use: Never       (Not in a hospital admission)  Allergies:  Patient has no known allergies.      Current Outpatient Medications:   •  omeprazole (priLOSEC) 20 MG capsule, Take 1 capsule by mouth Daily., Disp: 20 capsule, Rfl: 0  •  busPIRone HCl (BUSPAR PO), Take  by mouth 3 (Three) Times a Day., Disp: , Rfl:   •  Calcium Carbonate-Vitamin D (CALCIUM 600+D PO), Take  by mouth., Disp: , Rfl:   •  LISINOPRIL PO, Take  by mouth., Disp: , Rfl:   •  Sertraline HCl (ZOLOFT PO), Take  by mouth 2 (Two) Times a Day., Disp: , Rfl:     Objective     Vital Signs     Body mass index is 43.16 kg/m².      20  1350   Weight: 121 kg (267 lb 6.4 oz)       Physical Exam   Pulmonary/Chest: Effort normal.   Neurological: She is alert.   Psychiatric: She has a normal mood and affect.       Results Review:   None      Assessment/Plan   Encounter Diagnoses   Name Primary?   • Class 3 severe obesity due to excess calories with serious comorbidity and body mass index (BMI) of 40.0 to 44.9 in adult (CMS/HCC) Yes   • Essential hypertension          1. Katrin Friedman was seen today via telephone visit for follow-up, obesity, nutrition counseling and weight loss. She has maintained weight since her last visit, making her Body mass index is 43.16 kg/m².. Today we discussed consistency with healthy changes in lifestyle, diet, and exercise for long term success. Katrin Friedman had  received handouts to her explaining the recommendation on portion sizes/appetite control/reading nutrition labels. Intensive behavioral therapy for obesity was done today as well.     Goals for this month are: continue with making changes towards following the meal prescription as provided focusing on eating 4 meals/day with vegetables at every meal, eliminating carbohydrates after lunch, and getting adequate water and protein intake. Patient encouraged to call with questions and/or struggles as they may arise prior to next scheduled appointment.    2. Current comorbid condition of hypertension associated with her morbid obesity is reported to remain stable on her current treatment regimen and medications. We anticipate the comorbid condition to improve as we address her morbid obesity.     A total of 12 minutes was spent with this patient on the telephone and over half of the time was spent on counseling and coordination of care for the disease of obesity. We specifically reviewed the dietary prescription and I made recommendations toward increasing exercise as tolerated as well as focusing on training their behavior toward storing less.    Pre-op testing:    EGD - Normal  H. Pylori - Negative   H. Pylori Stool -  N/A    Psychological Evaluation - Ordered, but not yet completed    EKG - Normal    Cardiology - N/A     TSH - Normal  Nicotine - Ordered, but not yet completed    Pulmonary Clearance - N/A   Drug Tests - N/A    Dietitian - Completed     Follow up in 1 month, with Dr. Escamilla in office, for a weight recheck.    STEFANIE Rivas    06/30/20  13:56  Patient Care Team:  Umang Fuentes APRN as PCP - General (Nurse Practitioner)

## 2020-07-02 LAB
COTININE UR-MCNC: <1 NG/ML
NICOTINE SERPL-MCNC: <1 NG/ML

## 2020-07-23 ENCOUNTER — OFFICE VISIT (OUTPATIENT)
Dept: BARIATRICS/WEIGHT MGMT | Facility: CLINIC | Age: 25
End: 2020-07-23

## 2020-07-23 VITALS
HEART RATE: 85 BPM | SYSTOLIC BLOOD PRESSURE: 109 MMHG | HEIGHT: 65 IN | BODY MASS INDEX: 44.98 KG/M2 | DIASTOLIC BLOOD PRESSURE: 68 MMHG | OXYGEN SATURATION: 97 % | TEMPERATURE: 99.3 F | WEIGHT: 270 LBS

## 2020-07-23 DIAGNOSIS — K20.90 ESOPHAGITIS: ICD-10-CM

## 2020-07-23 DIAGNOSIS — I10 ESSENTIAL HYPERTENSION: ICD-10-CM

## 2020-07-23 DIAGNOSIS — F17.210 TOBACCO DEPENDENCE DUE TO CIGARETTES: Primary | ICD-10-CM

## 2020-07-23 DIAGNOSIS — E66.01 CLASS 3 SEVERE OBESITY DUE TO EXCESS CALORIES WITH SERIOUS COMORBIDITY AND BODY MASS INDEX (BMI) OF 40.0 TO 44.9 IN ADULT (HCC): ICD-10-CM

## 2020-07-23 PROCEDURE — 99213 OFFICE O/P EST LOW 20 MIN: CPT | Performed by: SURGERY

## 2020-07-23 NOTE — PROGRESS NOTES
Patient Care Team:  Umang Fuentes APRN as PCP - General (Nurse Practitioner)    Reason for Visit:  Surgical Weight loss    Subjective     Patient is a 25 y.o. female presents with morbid obesity and her Body mass index is 44.93 kg/m².     She is here for discussion of surgical weight loss options.  She stated she has been with the disease of obesity for year(s).  She stated she suffers from hypertension and morbid obesity due to her weight gain.  She stated that weight loss helps alleviate these symptoms.   She stated that she has tried multiple diets including completing a medically supervised weight loss program to help with weight loss.  She stated that she has attempted these conservative methods for weight loss without maintaining long term success.  Today she would like to discuss surgical weight loss options such as the Laparoscopic Sleeve Gastrectomy or the Laparoscopic R - Y Gastric Bypass.     Review of Systems  General ROS: positive for  - weight gain  Respiratory ROS: no cough, shortness of breath, or wheezing  Cardiovascular ROS: no chest pain or dyspnea on exertion  Gastrointestinal ROS: no abdominal pain, change in bowel habits, or black or bloody stools  Genito-Urinary ROS: no dysuria, trouble voiding, or hematuria    History  Past Medical History:   Diagnosis Date   • Anxiety    • Hypertension    • Joint pain    • Migraines    • Pre-diabetes      Past Surgical History:   Procedure Laterality Date   •  SECTION  2017    x1   • ENDOSCOPY N/A 2020    Procedure: ESOPHAGOGASTRODUODENOSCOPY WITH ANESTHESIA;  Surgeon: Charlie Escamilla MD;  Location: [facility] ENDOSCOPY;  Service: General;  Laterality: N/A;  pre op: pre op scope   post op: esohpagitis   PCP: Genie Caraballo MD   • HYSTERECTOMY  2018    open     Family History   Problem Relation Age of Onset   • Asthma Mother    • COPD Mother    • Heart disease Mother    • Hypertension Mother    • Stroke Mother    • Obesity Mother    •  Diabetes Father    • Heart disease Father    • Hypertension Father    • Stroke Father      Social History     Tobacco Use   • Smoking status: Former Smoker     Packs/day: 0.50     Last attempt to quit: 2020     Years since quittin.1   • Smokeless tobacco: Never Used   Substance Use Topics   • Alcohol use: Yes     Comment: once in a blue moon   • Drug use: Never       (Not in a hospital admission)  Allergies:  Patient has no known allergies.      Current Outpatient Medications:   •  omeprazole (priLOSEC) 20 MG capsule, Take 1 capsule by mouth Daily., Disp: 20 capsule, Rfl: 0  •  busPIRone HCl (BUSPAR PO), Take  by mouth 3 (Three) Times a Day., Disp: , Rfl:   •  Calcium Carbonate-Vitamin D (CALCIUM 600+D PO), Take  by mouth., Disp: , Rfl:   •  LISINOPRIL PO, Take  by mouth., Disp: , Rfl:   •  Sertraline HCl (ZOLOFT PO), Take  by mouth 2 (Two) Times a Day., Disp: , Rfl:     Objective     Vital Signs  Temp:  [99.3 °F (37.4 °C)] 99.3 °F (37.4 °C)  Heart Rate:  [85] 85  BP: (109)/(68) 109/68  Body mass index is 44.93 kg/m².      20  1447   Weight: 122 kg (270 lb)       General Appearance:  awake, alert, oriented, in no acute distress  Lungs:  Normal expansion.  Clear to auscultation.  No rales, rhonchi, or wheezing.  Heart:  Heart sounds are normal.  Regular rate and rhythm without murmur, gallop or rub.  Abdomen:  Soft, non-tender, normal bowel sounds; no bruits, organomegaly or masses.  Abnormal shape: obese  Extremities: Extremities warm to touch, pink, with no edema.      Results Review:   I reviewed the patient's new clinical results.        Assessment/Plan   Encounter Diagnoses   Name Primary?   • Tobacco dependence due to cigarettes Yes   • Class 3 severe obesity due to excess calories with serious comorbidity and body mass index (BMI) of 40.0 to 44.9 in adult (CMS/[facility])    • Essential hypertension    • Esophagitis        I believe this patient will be a good candidate for weight loss surgery.    She  has chosen robotic assisted laparoscopic sleeve gastrectomy. I agree with this decision.  I have discussed the Dorinda - Y Gastric Bypass, laparoscopic sleeve gastrectomy and the Laparoscopic Gastric Band procedures to provide the alternatives which includes non surgical weight loss options as well.  We discussed the benefits of the surgeries including the benefit of weight loss and the possible reversal of co-morbid conditions associated with morbid obesity.  She is aware that the Sleeve procedure is not reversible.  We discussed the complications and risks which include the risk of perforation, leakage,bleeding, intra-abdominal organ injury, stenosis or ulcerations, the risk of venous thrombosis formation in the lungs, mesentery veins or lower extremities  leading to possible organ injury and death.  I explained to her the possibility that this procedure may not be performed laparoscopically and may require being converted to an opened procedure or aborted due to abnormal anatomy.  Also postoperatively there is a risk of increased GERD symptoms after this procedure.  I have explained if she develops intestinal metaplasia of her esophagus consideration for conversion to another weight loss procedure may be necessary.         Katrin Friedman understands the surgical procedures and the different surgical options that are available.  She understands the lifestyle changes that are required after surgery and has agreed to follow the guidelines outlined in the weight management program. Katrin Friedman also expressed understanding of the risks involved and had all of female questions answered and desires to proceed.    Upon completion of our discussion and addressing and answering her questions to her satisfaction, informed consent was obtained.   She will be scheduled accordingly for a laparoscopic Sleeve gastrectomy procedure.    The patient has stopped the use of nicotine products and has a negative nicotine study to  support her change.    I discussed the patient's findings and my recommendations with patient.     I have also recommended that she obtain psych evaluation prior to surgery consideration.    Dr. Charlie Escamilla MD FACS    07/23/20  17:01  Patient Care Team:  Umang Fuentes APRN as PCP - General (Nurse Practitioner)

## 2020-07-27 ENCOUNTER — TELEPHONE (OUTPATIENT)
Dept: BARIATRICS/WEIGHT MGMT | Facility: CLINIC | Age: 25
End: 2020-07-27

## 2020-08-26 ENCOUNTER — TELEPHONE (OUTPATIENT)
Dept: BARIATRICS/WEIGHT MGMT | Facility: CLINIC | Age: 25
End: 2020-08-26

## 2020-08-27 ENCOUNTER — OFFICE VISIT (OUTPATIENT)
Dept: BARIATRICS/WEIGHT MGMT | Facility: CLINIC | Age: 25
End: 2020-08-27

## 2020-08-27 VITALS
BODY MASS INDEX: 45.82 KG/M2 | HEART RATE: 94 BPM | WEIGHT: 275 LBS | DIASTOLIC BLOOD PRESSURE: 81 MMHG | TEMPERATURE: 99.6 F | SYSTOLIC BLOOD PRESSURE: 120 MMHG | HEIGHT: 65 IN | OXYGEN SATURATION: 97 %

## 2020-08-27 DIAGNOSIS — K20.90 ESOPHAGITIS: ICD-10-CM

## 2020-08-27 DIAGNOSIS — F17.210 TOBACCO DEPENDENCE DUE TO CIGARETTES: ICD-10-CM

## 2020-08-27 DIAGNOSIS — E66.01 CLASS 3 SEVERE OBESITY DUE TO EXCESS CALORIES WITH SERIOUS COMORBIDITY AND BODY MASS INDEX (BMI) OF 40.0 TO 44.9 IN ADULT (HCC): Primary | ICD-10-CM

## 2020-08-27 DIAGNOSIS — I10 ESSENTIAL HYPERTENSION: ICD-10-CM

## 2020-08-27 PROCEDURE — 99214 OFFICE O/P EST MOD 30 MIN: CPT | Performed by: SURGERY

## 2020-08-27 RX ORDER — OMEPRAZOLE 20 MG/1
20 CAPSULE, DELAYED RELEASE ORAL DAILY
Qty: 20 CAPSULE | Refills: 0 | Status: SHIPPED | OUTPATIENT
Start: 2020-08-27 | End: 2021-06-23

## 2020-08-27 RX ORDER — APREPITANT 40 MG/1
40 CAPSULE ORAL DAILY
Qty: 1 CAPSULE | Refills: 0 | Status: ON HOLD | OUTPATIENT
Start: 2020-08-27 | End: 2020-09-17

## 2020-08-27 RX ORDER — ONDANSETRON 2 MG/ML
4 INJECTION INTRAMUSCULAR; INTRAVENOUS EVERY 6 HOURS PRN
Status: CANCELLED | OUTPATIENT
Start: 2020-08-27

## 2020-08-27 NOTE — PROGRESS NOTES
Patient Care Team:  Umang Fuentes APRN as PCP - General (Nurse Practitioner)    Reason for Visit:  Surgical Weight loss    Subjective     Patient is a 25 y.o. female presents with morbid obesity and her Body mass index is 45.41 kg/m².     She is here for discussion of surgical weight loss options.  She stated she has been with the disease of obesity for year(s).  She stated she suffers from hypertension and morbid obesity due to her weight gain.  She stated that weight loss helps alleviate these symptoms.   She stated that she has tried multiple dietary regimens to help with weight loss.  She stated that she has attempted these conservative methods for weight loss without maintaining long term success.  Today she would like to discuss surgical weight loss options such as the Laparoscopic Sleeve Gastrectomy or the Laparoscopic R - Y Gastric Bypass.   On endoscopy the patient was found to have signs of esophagitis.  She is been on omeprazole and has felt better since.    Review of Systems  General ROS: negative  Psychological ROS: negative  Ophthalmic ROS: negative  ENT ROS: negative  Endocrine ROS: negative  Respiratory ROS: no cough, shortness of breath, or wheezing  Cardiovascular ROS: no chest pain or dyspnea on exertion  Gastrointestinal ROS: no abdominal pain, change in bowel habits, or black or bloody stools  Genito-Urinary ROS: no dysuria, trouble voiding, or hematuria  Musculoskeletal ROS: negative  Neurological ROS: no TIA or stroke symptoms    History  Past Medical History:   Diagnosis Date   • Anxiety    • Hypertension    • Joint pain    • Migraines    • Pre-diabetes      Past Surgical History:   Procedure Laterality Date   •  SECTION  2017    x1   • ENDOSCOPY N/A 2020    Procedure: ESOPHAGOGASTRODUODENOSCOPY WITH ANESTHESIA;  Surgeon: Charlie Escamilla MD;  Location: [facility] ENDOSCOPY;  Service: General;  Laterality: N/A;  pre op: pre op scope   post op: esohpagitis   PCP: Rashmi  MD Genie   • HYSTERECTOMY  2018    open     Family History   Problem Relation Age of Onset   • Asthma Mother    • COPD Mother    • Heart disease Mother    • Hypertension Mother    • Stroke Mother    • Obesity Mother    • Diabetes Father    • Heart disease Father    • Hypertension Father    • Stroke Father      Social History     Tobacco Use   • Smoking status: Former Smoker     Packs/day: 0.50     Last attempt to quit: 2020     Years since quittin.2   • Smokeless tobacco: Never Used   Substance Use Topics   • Alcohol use: Yes     Comment: once in a blue moon   • Drug use: Never       (Not in a hospital admission)  Allergies:  Patient has no known allergies.      Current Outpatient Medications:   •  busPIRone HCl (BUSPAR PO), Take  by mouth 3 (Three) Times a Day., Disp: , Rfl:   •  Calcium Carbonate-Vitamin D (CALCIUM 600+D PO), Take  by mouth., Disp: , Rfl:   •  LISINOPRIL PO, Take  by mouth., Disp: , Rfl:   •  omeprazole (priLOSEC) 20 MG capsule, Take 1 capsule by mouth Daily., Disp: 20 capsule, Rfl: 0  •  Sertraline HCl (ZOLOFT PO), Take  by mouth 2 (Two) Times a Day., Disp: , Rfl:     Objective     Vital Signs  Temp:  [99.6 °F (37.6 °C)] 99.6 °F (37.6 °C)  Heart Rate:  [94] 94  BP: (120)/(81) 120/81  Body mass index is 45.41 kg/m².      20  1045   Weight: 125 kg (275 lb)       General Appearance:  awake, alert, oriented, in no acute distress  Lungs:  Normal expansion.  Clear to auscultation.  No rales, rhonchi, or wheezing.  Heart:  Heart sounds are normal.  Regular rate and rhythm without murmur, gallop or rub.  Abdomen:  Soft, non-tender, normal bowel sounds; no bruits, organomegaly or masses.  Abnormal shape: obese  Extremities: Extremities warm to touch, pink, with no edema.      Results Review:   None        Assessment/Plan   Encounter Diagnoses   Name Primary?   • Class 3 severe obesity due to excess calories with serious comorbidity and body mass index (BMI) of 40.0 to 44.9 in adult (CMS/[facility])  Yes   • Essential hypertension    • Tobacco dependence due to cigarettes    • Esophagitis        I believe this patient will be a good candidate for weight loss surgery.    She has chosen the robotic assisted laparoscopic sleeve gastrectomy. I agree with this decision.  I have discussed the Dorinda - Y Gastric Bypass, laparoscopic sleeve gastrectomy and the Laparoscopic Gastric Band procedures to provide the alternatives which includes non surgical weight loss options as well.  We discussed the benefits of the surgeries including the benefit of weight loss and the possible reversal of co-morbid conditions associated with morbid obesity.  She is aware that the Sleeve procedure is not reversible.  We discussed the complications and risks which include the risk of perforation, leakage,bleeding, intra-abdominal organ injury, stenosis or ulcerations, the risk of venous thrombosis formation in the lungs, mesentery veins or lower extremities  leading to possible organ injury and death.  I explained to her the possibility that this procedure may not be performed laparoscopically and may require being converted to an opened procedure or aborted due to abnormal anatomy.  Also postoperatively there is a risk of increased GERD symptoms after this procedure.  I have explained if she develops intestinal metaplasia of her esophagus consideration for conversion to another weight loss procedure may be necessary.      Alfonso Report   As part of this patient's treatment plan I am prescribing controlled substances. The patient has been made aware of appropriate use of such medications, including potential risk of somnolence, limited ability to drive and /or work safely, and potential for dependence or overdose. It has also been made clear that these medications are for use by this patient only, without concomitant use of alcohol or other substances unless prescribed.    Katrin Friedman will be required to complete the educational program  "\"[employer]\" detailing terms of continued prescribing of controlled substances, including monitoring JADIEL reports, urine drug screening, and pill counts if necessary. Katrin Friedman is aware that inappropriate use will result in cessation of prescribing such medications.    JADIEL report has been reviewed.      History and physical exam exhibit continued safe and appropriate use of controlled substances.       Katrin Friedman understands the surgical procedures and the different surgical options that are available.  She understands the lifestyle changes that are required after surgery and has agreed to follow the guidelines outlined in the weight management program. Katrin Friedman also expressed understanding of the risks involved and had all of female questions answered and desires to proceed.    Upon completion of our discussion and addressing and answering her questions to her satisfaction, informed consent was obtained.   She will be scheduled accordingly for a laparoscopic Sleeve gastrectomy procedure.  The patient states that her symptoms of her esophagitis has improved since being placed on omeprazole.  Her blood pressures remained stable on his current treatment regimen.  I anticipate improvement of her her comorbid conditions associated with morbid obesity with reversal of her morbid obesity.    I discussed the patient's findings and my recommendations with patient.     I have also recommended that she obtain completion of her psych evaluation, cardiac risk assessment and preoperative laboratory work prior to surgery consideration.    Dr. Charlie Escamilla MD FACS    08/27/20  11:28  Patient Care Team:  Umang Fuentes APRN as PCP - General (Nurse Practitioner)    "

## 2020-09-01 PROBLEM — I10 ESSENTIAL HYPERTENSION: Status: ACTIVE | Noted: 2020-09-01

## 2020-09-09 ENCOUNTER — TELEPHONE (OUTPATIENT)
Dept: BARIATRICS/WEIGHT MGMT | Facility: CLINIC | Age: 25
End: 2020-09-09

## 2020-09-09 NOTE — TELEPHONE ENCOUNTER
Katrin called in to ask some questions regarding her surgery. I went on an informed her that I had just received her authorization from insurance and that her surgery is scheduled. Informed her to come to pre-work 9/10/20 at 12:15 pm. Also informed patient to arrive 9/17/20 at 6:30 am for surgery day. Patient was informed she would receive a call from hospital to schedule covid test.

## 2020-09-10 ENCOUNTER — APPOINTMENT (OUTPATIENT)
Dept: PREADMISSION TESTING | Facility: HOSPITAL | Age: 25
End: 2020-09-10

## 2020-09-10 ENCOUNTER — TRANSCRIBE ORDERS (OUTPATIENT)
Dept: ADMINISTRATIVE | Facility: HOSPITAL | Age: 25
End: 2020-09-10

## 2020-09-10 VITALS
HEART RATE: 69 BPM | RESPIRATION RATE: 18 BRPM | WEIGHT: 266.98 LBS | DIASTOLIC BLOOD PRESSURE: 73 MMHG | HEIGHT: 66 IN | SYSTOLIC BLOOD PRESSURE: 108 MMHG | BODY MASS INDEX: 42.91 KG/M2 | OXYGEN SATURATION: 98 %

## 2020-09-10 DIAGNOSIS — F17.210 TOBACCO DEPENDENCE DUE TO CIGARETTES: ICD-10-CM

## 2020-09-10 DIAGNOSIS — Z11.59 SCREENING FOR VIRAL DISEASE: Primary | ICD-10-CM

## 2020-09-10 DIAGNOSIS — E66.01 CLASS 3 SEVERE OBESITY DUE TO EXCESS CALORIES WITH SERIOUS COMORBIDITY AND BODY MASS INDEX (BMI) OF 40.0 TO 44.9 IN ADULT (HCC): ICD-10-CM

## 2020-09-10 LAB
ALBUMIN SERPL-MCNC: 4.8 G/DL (ref 3.5–5.2)
ALBUMIN/GLOB SERPL: 1.8 G/DL
ALP SERPL-CCNC: 70 U/L (ref 39–117)
ALT SERPL W P-5'-P-CCNC: 39 U/L (ref 1–33)
ANION GAP SERPL CALCULATED.3IONS-SCNC: 11 MMOL/L (ref 5–15)
APTT PPP: 32.3 SECONDS (ref 24.1–35)
AST SERPL-CCNC: 38 U/L (ref 1–32)
BILIRUB SERPL-MCNC: 0.4 MG/DL (ref 0–1.2)
BILIRUB UR QL STRIP: NEGATIVE
BUN SERPL-MCNC: 7 MG/DL (ref 6–20)
BUN/CREAT SERPL: 12.3 (ref 7–25)
CALCIUM SPEC-SCNC: 8.9 MG/DL (ref 8.6–10.5)
CHLORIDE SERPL-SCNC: 107 MMOL/L (ref 98–107)
CLARITY UR: CLEAR
CO2 SERPL-SCNC: 22 MMOL/L (ref 22–29)
COLOR UR: YELLOW
CREAT SERPL-MCNC: 0.57 MG/DL (ref 0.57–1)
DEPRECATED RDW RBC AUTO: 37.3 FL (ref 37–54)
ERYTHROCYTE [DISTWIDTH] IN BLOOD BY AUTOMATED COUNT: 11.6 % (ref 12.3–15.4)
GFR SERPL CREATININE-BSD FRML MDRD: 127 ML/MIN/1.73
GLOBULIN UR ELPH-MCNC: 2.6 GM/DL
GLUCOSE SERPL-MCNC: 94 MG/DL (ref 65–99)
GLUCOSE UR STRIP-MCNC: NEGATIVE MG/DL
HCT VFR BLD AUTO: 37.1 % (ref 34–46.6)
HGB BLD-MCNC: 12.6 G/DL (ref 12–15.9)
HGB UR QL STRIP.AUTO: NEGATIVE
INR PPP: 1.04 (ref 0.91–1.09)
KETONES UR QL STRIP: ABNORMAL
LEUKOCYTE ESTERASE UR QL STRIP.AUTO: NEGATIVE
MCH RBC QN AUTO: 29.9 PG (ref 26.6–33)
MCHC RBC AUTO-ENTMCNC: 34 G/DL (ref 31.5–35.7)
MCV RBC AUTO: 87.9 FL (ref 79–97)
NITRITE UR QL STRIP: NEGATIVE
PH UR STRIP.AUTO: 5.5 [PH] (ref 5–8)
PLATELET # BLD AUTO: 235 10*3/MM3 (ref 140–450)
PMV BLD AUTO: 10.4 FL (ref 6–12)
POTASSIUM SERPL-SCNC: 3.9 MMOL/L (ref 3.5–5.2)
PROT SERPL-MCNC: 7.4 G/DL (ref 6–8.5)
PROT UR QL STRIP: NEGATIVE
PROTHROMBIN TIME: 13.2 SECONDS (ref 11.9–14.6)
RBC # BLD AUTO: 4.22 10*6/MM3 (ref 3.77–5.28)
SODIUM SERPL-SCNC: 140 MMOL/L (ref 136–145)
SP GR UR STRIP: 1.01 (ref 1–1.03)
UROBILINOGEN UR QL STRIP: ABNORMAL
WBC # BLD AUTO: 6.22 10*3/MM3 (ref 3.4–10.8)

## 2020-09-10 PROCEDURE — 36415 COLL VENOUS BLD VENIPUNCTURE: CPT

## 2020-09-10 PROCEDURE — 85610 PROTHROMBIN TIME: CPT | Performed by: SURGERY

## 2020-09-10 PROCEDURE — 80053 COMPREHEN METABOLIC PANEL: CPT | Performed by: SURGERY

## 2020-09-10 PROCEDURE — G0480 DRUG TEST DEF 1-7 CLASSES: HCPCS | Performed by: SURGERY

## 2020-09-10 PROCEDURE — 85730 THROMBOPLASTIN TIME PARTIAL: CPT | Performed by: SURGERY

## 2020-09-10 PROCEDURE — 85027 COMPLETE CBC AUTOMATED: CPT | Performed by: SURGERY

## 2020-09-10 PROCEDURE — 81003 URINALYSIS AUTO W/O SCOPE: CPT | Performed by: SURGERY

## 2020-09-10 NOTE — DISCHARGE INSTRUCTIONS
DAY OF SURGERY INSTRUCTIONS        YOUR SURGEON: ***    PROCEDURE: ***    DATE OF SURGERY: ***    ARRIVAL TIME: AS DIRECTED BY OFFICE    YOU MAY TAKE THE FOLLOWING MEDICATION(S) THE MORNING OF SURGERY WITH A SIP OF WATER: ***      ALL OTHER HOME MEDICATIONS CHECK WITH YOUR DOCTOR                   MANAGING PAIN AFTER SURGERY    We know you are probably wondering what your pain will be like after surgery.  Following surgery it is unrealistic to expect you will not have pain.   Pain is how our bodies let us know that something is wrong or cautions us to be careful.  That said, our goal is to make your pain tolerable.    Methods we may use to treat your pain include (oral or IV medications, PCAs, epidurals, nerve blocks, etc.)   While some procedures require IV pain medications for a short time after surgery, transitioning to pain medications by mouth allows for better management of pain.   Your nurse will encourage you to take oral pain medications whenever possible.  IV medications work almost immediately, but only last a short while.  Taking medications by mouth allows for a more constant level of medication in your blood stream for a longer period of time.      Once your pain is out of control it is harder to get back under control.  It is important you are aware when your next dose of pain medication is due.  If you are admitted, your nurse may write the time of your next dose on the white board in your room to help you remember.      We are interested in your pain and encourage you to inform us about aggravating factors during your visit.   Many times a simple repositioning every few hours can make a big difference.    If your physician says it is okay, do not let your pain prevent you from getting out of bed. Be sure to call your nurse for assistance prior to getting up so you do not fall.      Before surgery, please decide your tolerable pain goal.  These faces help describe the pain ratings we use on a 0-10  scale.   Be prepared to tell us your goal and whether or not you take pain or anxiety medications at home.        BEFORE YOU COME TO THE HOSPITAL  (Pre-op instructions)  • Do not eat, drink, smoke or chew gum after midnight the night before surgery.  This also includes no mints.  • Morning of surgery take only the medicines you have been instructed with a sip of water unless otherwise instructed  by your physician.  • Do not shave, wear makeup or dark nail polish.  • Remove all jewelry including rings.  • Leave anything you consider valuable at home.  • Leave your suitcase in the car until after your surgery.  • Bring the following with you if applicable:  o Picture ID and insurance, Medicare or Medicaid cards  o Co-pay/deductible required by insurance (cash, check, credit card)  o Copy of advance directive, living will or power-of- documents if not brought to PAT  o CPAP or BIPAP mask and tubing  o Relaxation aids ( book, magazine), etc.  o Hearing aids                                  ON THE DAY OF SURGERY  · On the day of surgery check in at registration located at the main entrance of the hospital.   ? You will be registered and given a beeper with instructions where to wait in the main lobby.  ? When your beeper lights up and vibrates a member of the Outpatient Surgery staff will meet you at the double doors under the stair steps and escort you to your preoperative room.   · You may have cloth compression devices placed on your legs. These help to prevent blood clots and reduce swelling in your legs.  · An IV may be inserted into one of your veins.  · In the operating room, you may be given one or more of the following:  ? A medicine to help you relax (sedative).  ? A medicine to numb the area (local anesthetic).  ? A medicine to make you fall asleep (general anesthetic).  ? A medicine that is injected into an area of your body to numb everything below the injection site (regional anesthetic).  · Your  "surgical site will be marked or identified.  · You may be given an antibiotic through your IV to help prevent infection.  Contact a health care provider if you:  · Develop a fever of more than 100.4°F (38°C) or other feelings of illness during the 48 hours before your surgery.  · Have symptoms that get worse.  Have questions or concerns about your surgery    General Anesthesia/Surgery, Adult  General anesthesia is the use of medicines to make a person \"go to sleep\" (unconscious) for a medical procedure. General anesthesia must be used for certain procedures, and is often recommended for procedures that:  · Last a long time.  · Require you to be still or in an unusual position.  · Are major and can cause blood loss.  The medicines used for general anesthesia are called general anesthetics. As well as making you unconscious for a certain amount of time, these medicines:  · Prevent pain.  · Control your blood pressure.  · Relax your muscles.  Tell a health care provider about:  · Any allergies you have.  · All medicines you are taking, including vitamins, herbs, eye drops, creams, and over-the-counter medicines.  · Any problems you or family members have had with anesthetic medicines.  · Types of anesthetics you have had in the past.  · Any blood disorders you have.  · Any surgeries you have had.  · Any medical conditions you have.  · Any recent upper respiratory, chest, or ear infections.  · Any history of:  ? Heart or lung conditions, such as heart failure, sleep apnea, asthma, or chronic obstructive pulmonary disease (COPD).  ?  service.  ? Depression or anxiety.  · Any tobacco or drug use, including marijuana or alcohol use.  · Whether you are pregnant or may be pregnant.  What are the risks?  Generally, this is a safe procedure. However, problems may occur, including:  · Allergic reaction.  · Lung and heart problems.  · Inhaling food or liquid from the stomach into the lungs (aspiration).  · Nerve " injury.  · Air in the bloodstream, which can lead to stroke.  · Extreme agitation or confusion (delirium) when you wake up from the anesthetic.  · Waking up during your procedure and being unable to move. This is rare.  These problems are more likely to develop if you are having a major surgery or if you have an advanced or serious medical condition. You can prevent some of these complications by answering all of your health care provider's questions thoroughly and by following all instructions before your procedure.  General anesthesia can cause side effects, including:  · Nausea or vomiting.  · A sore throat from the breathing tube.  · Hoarseness.  · Wheezing or coughing.  · Shaking chills.  · Tiredness.  · Body aches.  · Anxiety.  · Sleepiness or drowsiness.  · Confusion or agitation.  RISKS AND COMPLICATIONS OF SURGERY  Your health care provider will discuss possible risks and complications with you before surgery. Common risks and complications include:    · Problems due to the use of anesthetics.  · Blood loss and replacement (does not apply to minor surgical procedures).  · Temporary increase in pain due to surgery.  · Uncorrected pain or problems that the surgery was meant to correct.  · Infection.  · New damage.    What happens before the procedure?    Medicines  Ask your health care provider about:  · Changing or stopping your regular medicines. This is especially important if you are taking diabetes medicines or blood thinners.  · Taking medicines such as aspirin and ibuprofen. These medicines can thin your blood. Do not take these medicines unless your health care provider tells you to take them.  · Taking over-the-counter medicines, vitamins, herbs, and supplements. Do not take these during the week before your procedure unless your health care provider approves them.  General instructions  · Starting 3-6 weeks before the procedure, do not use any products that contain nicotine or tobacco, such as  cigarettes and e-cigarettes. If you need help quitting, ask your health care provider.  · If you brush your teeth on the morning of the procedure, make sure to spit out all of the toothpaste.  · Tell your health care provider if you become ill or develop a cold, cough, or fever.  · If instructed by your health care provider, bring your sleep apnea device with you on the day of your surgery (if applicable).  · Ask your health care provider if you will be going home the same day, the following day, or after a longer hospital stay.  ? Plan to have someone take you home from the hospital or clinic.  ? Plan to have a responsible adult care for you for at least 24 hours after you leave the hospital or clinic. This is important.  What happens during the procedure?  · You will be given anesthetics through both of the following:  ? A mask placed over your nose and mouth.  ? An IV in one of your veins.  · You may receive a medicine to help you relax (sedative).  · After you are unconscious, a breathing tube may be inserted down your throat to help you breathe. This will be removed before you wake up.  · An anesthesia specialist will stay with you throughout your procedure. He or she will:  ? Keep you comfortable and safe by continuing to give you medicines and adjusting the amount of medicine that you get.  ? Monitor your blood pressure, pulse, and oxygen levels to make sure that the anesthetics do not cause any problems.  The procedure may vary among health care providers and hospitals.  What happens after the procedure?  · Your blood pressure, temperature, heart rate, breathing rate, and blood oxygen level will be monitored until the medicines you were given have worn off.  · You will wake up in a recovery area. You may wake up slowly.  · If you feel anxious or agitated, you may be given medicine to help you calm down.  · If you will be going home the same day, your health care provider may check to make sure you can walk,  drink, and urinate.  · Your health care provider will treat any pain or side effects you have before you go home.  · Do not drive for 24 hours if you were given a sedative.  Summary  · General anesthesia is used to keep you still and prevent pain during a procedure.  · It is important to tell your healthcare provider about your medical history and any surgeries you have had, and previous experience with anesthesia.  · Follow your healthcare provider’s instructions about when to stop eating, drinking, or taking certain medicines before your procedure.  · Plan to have someone take you home from the hospital or clinic.  This information is not intended to replace advice given to you by your health care provider. Make sure you discuss any questions you have with your health care provider.  Document Released: 03/26/2009 Document Revised: 08/03/2018 Document Reviewed: 08/03/2018  [employer] Interactive Patient Education © 2019 [employer]    Fall Prevention in Hospitals, Adult  As a hospital patient, your condition and the treatments you receive can increase your risk for falls. Some additional risk factors for falls in a hospital include:  · Being in an unfamiliar environment.  · Being on bed rest.  · Your surgery.  · Taking certain medicines.  · Your tubing requirements, such as intravenous (IV) therapy or catheters.  It is important that you learn how to decrease fall risks while at the hospital. Below are important tips that can help prevent falls.  SAFETY TIPS FOR PREVENTING FALLS  Talk about your risk of falling.  · Ask your health care provider why you are at risk for falling. Is it your medicine, illness, tubing placement, or something else?  · Make a plan with your health care provider to keep you safe from falls.  · Ask your health care provider or pharmacist about side effects of your medicines. Some medicines can make you dizzy or affect your coordination.  Ask for help.  · Ask for help before getting out of  bed. You may need to press your call button.  · Ask for assistance in getting safely to the toilet.  · Ask for a walker or cane to be put at your bedside. Ask that most of the side rails on your bed be placed up before your health care provider leaves the room.  · Ask family or friends to sit with you.  · Ask for things that are out of your reach, such as your glasses, hearing aids, telephone, bedside table, or call button.  Follow these tips to avoid falling:  · Stay lying or seated, rather than standing, while waiting for help.  · Wear rubber-soled slippers or shoes whenever you walk in the hospital.  · Avoid quick, sudden movements.  ¨ Change positions slowly.  ¨ Sit on the side of your bed before standing.  ¨ Stand up slowly and wait before you start to walk.  · Let your health care provider know if there is a spill on the floor.  · Pay careful attention to the medical equipment, electrical cords, and tubes around you.  · When you need help, use your call button by your bed or in the bathroom. Wait for one of your health care providers to help you.  · If you feel dizzy or unsure of your footing, return to bed and wait for assistance.  · Avoid being distracted by the TV, telephone, or another person in your room.  · Do not lean or support yourself on rolling objects, such as IV poles or bedside tables.     This information is not intended to replace advice given to you by your health care provider. Make sure you discuss any questions you have with your health care provider.     Document Released: 12/15/2001 Document Revised: 01/08/2016 Document Reviewed: 08/25/2013  [employer] Interactive Patient Education ©2016 [employer]         [facility]  CHG 4% Patient Instruction Sheet    Chlorhexidine Before Surgery  Chlorhexidine gluconate (CHG) is a germ-killing (antiseptic) solution that is used to clean the skin. It gets rid of the bacteria that normally live on the skin. Cleaning your skin with CHG before  surgery helps lower the risk for infection after surgery.     How to use CHG solution  · You will take 2 showers, one shower the night before surgery, the second shower the morning of surgery before coming to the hospital.  · Use CHG only as told by your health care provider, and follow the instructions on the label.  · Use CHG solution while taking a shower. Follow these steps when using CHG solution (unless your health care provider gives you different instructions):  1. Start the shower.  2. Use your normal soap and shampoo to wash your face and hair.  3. Turn off the shower or move out of the shower stream.  4. Pour the CHG onto a clean washcloth. Do not use any type of brush or rough-edged sponge.  5. Starting at your neck, lather your body down to your toes. Make sure you:  6. Pay special attention to the part of your body where you will be having surgery. Scrub this area for at least 1 minute.  7. Use the full amount of CHG as directed. Usually, this is one half bottle for each shower.  8. Do not use CHG on your head or face. If the solution gets into your ears or eyes, rinse them well with water.  9. Avoid your genital area.  10. Avoid any areas of skin that have broken skin, cuts, or scrapes.  11. Scrub your back and under your arms. Make sure to wash skin folds.  12. Let the lather sit on your skin for 1-2 minutes or as long as told by your health care provider.  13. Thoroughly rinse your entire body in the shower. Make sure that all body creases and crevices are rinsed well.  14. Dry off with a clean towel. Do not put any substances on your body afterward, such as powder, lotion, or perfume.  15. Put on clean clothes or pajamas.  16. If it is the night before your surgery, sleep in clean sheets.    What are the risks?  Risks of using CHG include:  · A skin reaction.  · Hearing loss, if CHG gets in your ears.  · Eye injury, if CHG gets in your eyes and is not rinsed out.  · The CHG product catching  fire.  Make sure that you avoid smoking and flames after applying CHG to your skin.  Do not use CHG:  · If you have a chlorhexidine allergy or have previously reacted to chlorhexidine.  · On babies younger than 2 months of age.      On the day of surgery, when you are taken to your room in Outpatient Surgery you will be given a CHG prepackaged cloth to wipe the site for your surgery.  How to use CHG prepackaged cloths  · Follow the instructions on the label.  · Use the CHG cloth on clean, dry skin. Follow these steps when using a CHG cloth(unless your health care provider gives you different instructions):  1. Using the CHG cloth, vigorously scrub the part of your body where you will be having surgery. Scrub using a back-and-forth motion for 3 minutes. The area on your body should be completely wet with CHG when you are finished scrubbing.  2. Do not rinse. Discard the cloth and let the area air-dry for 1 minute. Do not put any substances on your body afterward, such as powder, lotion, or perfume.  Contact a health care provider if:  · Your skin gets irritated after scrubbing.  · You have questions about using your solution or cloth.  Get help right away if:  · Your eyes become very red or swollen.  · Your eyes itch badly.  · Your skin itches badly and is red or swollen.  · Your hearing changes.  · You have trouble seeing.  · You have swelling or tingling in your mouth or throat.  · You have trouble breathing.  · You swallow any chlorhexidine.  Summary  · Chlorhexidine gluconate (CHG) is a germ-killing (antiseptic) solution that is used to clean the skin. Cleaning your skin with CHG before surgery helps lower the risk for infection after surgery.  · You may be given CHG to use at home. It may be in a bottle or in a prepackaged cloth to use on your skin. Carefully follow your health care provider's instructions and the instructions on the product label.  · Do not use CHG if you have a chlorhexidine  allergy.  · Contact your health care provider if your skin gets irritated after scrubbing.  This information is not intended to replace advice given to you by your health care provider. Make sure you discuss any questions you have with your health care provider.  Document Released: 09/11/2013 Document Revised: 11/15/2018 Document Reviewed: 11/15/2018  [employer] Interactive Patient Education © 2019 [employer]        DAY OF SURGERY INSTRUCTIONS        YOUR SURGEON: dr brumfield    PROCEDURE: ***gastric sleeve laparoscopic with davinci robot  DATE OF SURGERY: ***9/17/2020    ARRIVAL TIME: AS DIRECTED BY OFFICE    YOU MAY TAKE THE FOLLOWING MEDICATION(S) THE MORNING OF SURGERY WITH A SIP OF WATER: ***buspar per anesthesia      ALL OTHER HOME MEDICATIONS CHECK WITH YOUR DOCTOR                MANAGING PAIN AFTER SURGERY    We know you are probably wondering what your pain will be like after surgery.  Following surgery it is unrealistic to expect you will not have pain.   Pain is how our bodies let us know that something is wrong or cautions us to be careful.  That said, our goal is to make your pain tolerable.    Methods we may use to treat your pain include (oral or IV medications, PCAs, epidurals, nerve blocks, etc.)   While some procedures require IV pain medications for a short time after surgery, transitioning to pain medications by mouth allows for better management of pain.   Your nurse will encourage you to take oral pain medications whenever possible.  IV medications work almost immediately, but only last a short while.  Taking medications by mouth allows for a more constant level of medication in your blood stream for a longer period of time.      Once your pain is out of control it is harder to get back under control.  It is important you are aware when your next dose of pain medication is due.  If you are admitted, your nurse may write the time of your next dose on the white board in your room to help you  remember.      We are interested in your pain and encourage you to inform us about aggravating factors during your visit.   Many times a simple repositioning every few hours can make a big difference.    If your physician says it is okay, do not let your pain prevent you from getting out of bed. Be sure to call your nurse for assistance prior to getting up so you do not fall.      Before surgery, please decide your tolerable pain goal.  These faces help describe the pain ratings we use on a 0-10 scale.   Be prepared to tell us your goal and whether or not you take pain or anxiety medications at home.        BEFORE YOU COME TO THE HOSPITAL  (Pre-op instructions)  • Do not eat, drink, smoke or chew gum after midnight the night before surgery.  This also includes no mints.  • Morning of surgery take only the medicines you have been instructed with a sip of water unless otherwise instructed  by your physician.  • Do not shave, wear makeup or dark nail polish.  • Remove all jewelry including rings.  • Leave anything you consider valuable at home.  • Leave your suitcase in the car until after your surgery.  • Bring the following with you if applicable:  o Picture ID and insurance, Medicare or Medicaid cards  o Co-pay/deductible required by insurance (cash, check, credit card)  o Copy of advance directive, living will or power-of- documents if not brought to PAT  o CPAP or BIPAP mask and tubing  o Relaxation aids ( book, magazine), etc.  o Hearing aids                                  ON THE DAY OF SURGERY  · On the day of surgery check in at registration located at the main entrance of the hospital.   ? You will be registered and given a beeper with instructions where to wait in the main lobby.  ? When your beeper lights up and vibrates a member of the Outpatient Surgery staff will meet you at the double doors under the stair steps and escort you to your preoperative room.   · You may have cloth compression  "devices placed on your legs. These help to prevent blood clots and reduce swelling in your legs.  · An IV may be inserted into one of your veins.  · In the operating room, you may be given one or more of the following:  ? A medicine to help you relax (sedative).  ? A medicine to numb the area (local anesthetic).  ? A medicine to make you fall asleep (general anesthetic).  ? A medicine that is injected into an area of your body to numb everything below the injection site (regional anesthetic).  · Your surgical site will be marked or identified.  · You may be given an antibiotic through your IV to help prevent infection.  Contact a health care provider if you:  · Develop a fever of more than 100.4°F (38°C) or other feelings of illness during the 48 hours before your surgery.  · Have symptoms that get worse.  Have questions or concerns about your surgery    General Anesthesia/Surgery, Adult  General anesthesia is the use of medicines to make a person \"go to sleep\" (unconscious) for a medical procedure. General anesthesia must be used for certain procedures, and is often recommended for procedures that:  · Last a long time.  · Require you to be still or in an unusual position.  · Are major and can cause blood loss.  The medicines used for general anesthesia are called general anesthetics. As well as making you unconscious for a certain amount of time, these medicines:  · Prevent pain.  · Control your blood pressure.  · Relax your muscles.  Tell a health care provider about:  · Any allergies you have.  · All medicines you are taking, including vitamins, herbs, eye drops, creams, and over-the-counter medicines.  · Any problems you or family members have had with anesthetic medicines.  · Types of anesthetics you have had in the past.  · Any blood disorders you have.  · Any surgeries you have had.  · Any medical conditions you have.  · Any recent upper respiratory, chest, or ear infections.  · Any history of:  ? Heart or " lung conditions, such as heart failure, sleep apnea, asthma, or chronic obstructive pulmonary disease (COPD).  ?  service.  ? Depression or anxiety.  · Any tobacco or drug use, including marijuana or alcohol use.  · Whether you are pregnant or may be pregnant.  What are the risks?  Generally, this is a safe procedure. However, problems may occur, including:  · Allergic reaction.  · Lung and heart problems.  · Inhaling food or liquid from the stomach into the lungs (aspiration).  · Nerve injury.  · Air in the bloodstream, which can lead to stroke.  · Extreme agitation or confusion (delirium) when you wake up from the anesthetic.  · Waking up during your procedure and being unable to move. This is rare.  These problems are more likely to develop if you are having a major surgery or if you have an advanced or serious medical condition. You can prevent some of these complications by answering all of your health care provider's questions thoroughly and by following all instructions before your procedure.  General anesthesia can cause side effects, including:  · Nausea or vomiting.  · A sore throat from the breathing tube.  · Hoarseness.  · Wheezing or coughing.  · Shaking chills.  · Tiredness.  · Body aches.  · Anxiety.  · Sleepiness or drowsiness.  · Confusion or agitation.  RISKS AND COMPLICATIONS OF SURGERY  Your health care provider will discuss possible risks and complications with you before surgery. Common risks and complications include:    · Problems due to the use of anesthetics.  · Blood loss and replacement (does not apply to minor surgical procedures).  · Temporary increase in pain due to surgery.  · Uncorrected pain or problems that the surgery was meant to correct.  · Infection.  · New damage.    What happens before the procedure?    Medicines  Ask your health care provider about:  · Changing or stopping your regular medicines. This is especially important if you are taking diabetes medicines or  blood thinners.  · Taking medicines such as aspirin and ibuprofen. These medicines can thin your blood. Do not take these medicines unless your health care provider tells you to take them.  · Taking over-the-counter medicines, vitamins, herbs, and supplements. Do not take these during the week before your procedure unless your health care provider approves them.  General instructions  · Starting 3-6 weeks before the procedure, do not use any products that contain nicotine or tobacco, such as cigarettes and e-cigarettes. If you need help quitting, ask your health care provider.  · If you brush your teeth on the morning of the procedure, make sure to spit out all of the toothpaste.  · Tell your health care provider if you become ill or develop a cold, cough, or fever.  · If instructed by your health care provider, bring your sleep apnea device with you on the day of your surgery (if applicable).  · Ask your health care provider if you will be going home the same day, the following day, or after a longer hospital stay.  ? Plan to have someone take you home from the hospital or clinic.  ? Plan to have a responsible adult care for you for at least 24 hours after you leave the hospital or clinic. This is important.  What happens during the procedure?  · You will be given anesthetics through both of the following:  ? A mask placed over your nose and mouth.  ? An IV in one of your veins.  · You may receive a medicine to help you relax (sedative).  · After you are unconscious, a breathing tube may be inserted down your throat to help you breathe. This will be removed before you wake up.  · An anesthesia specialist will stay with you throughout your procedure. He or she will:  ? Keep you comfortable and safe by continuing to give you medicines and adjusting the amount of medicine that you get.  ? Monitor your blood pressure, pulse, and oxygen levels to make sure that the anesthetics do not cause any problems.  The procedure  may vary among health care providers and hospitals.  What happens after the procedure?  · Your blood pressure, temperature, heart rate, breathing rate, and blood oxygen level will be monitored until the medicines you were given have worn off.  · You will wake up in a recovery area. You may wake up slowly.  · If you feel anxious or agitated, you may be given medicine to help you calm down.  · If you will be going home the same day, your health care provider may check to make sure you can walk, drink, and urinate.  · Your health care provider will treat any pain or side effects you have before you go home.  · Do not drive for 24 hours if you were given a sedative.  Summary  · General anesthesia is used to keep you still and prevent pain during a procedure.  · It is important to tell your healthcare provider about your medical history and any surgeries you have had, and previous experience with anesthesia.  · Follow your healthcare provider’s instructions about when to stop eating, drinking, or taking certain medicines before your procedure.  · Plan to have someone take you home from the hospital or clinic.  This information is not intended to replace advice given to you by your health care provider. Make sure you discuss any questions you have with your health care provider.  Document Released: 03/26/2009 Document Revised: 08/03/2018 Document Reviewed: 08/03/2018  [employer] Patient Education © 2019 [employer]    Fall Prevention in Hospitals, Adult  As a hospital patient, your condition and the treatments you receive can increase your risk for falls. Some additional risk factors for falls in a hospital include:  · Being in an unfamiliar environment.  · Being on bed rest.  · Your surgery.  · Taking certain medicines.  · Your tubing requirements, such as intravenous (IV) therapy or catheters.  It is important that you learn how to decrease fall risks while at the hospital. Below are important tips that can  help prevent falls.  SAFETY TIPS FOR PREVENTING FALLS  Talk about your risk of falling.  · Ask your health care provider why you are at risk for falling. Is it your medicine, illness, tubing placement, or something else?  · Make a plan with your health care provider to keep you safe from falls.  · Ask your health care provider or pharmacist about side effects of your medicines. Some medicines can make you dizzy or affect your coordination.  Ask for help.  · Ask for help before getting out of bed. You may need to press your call button.  · Ask for assistance in getting safely to the toilet.  · Ask for a walker or cane to be put at your bedside. Ask that most of the side rails on your bed be placed up before your health care provider leaves the room.  · Ask family or friends to sit with you.  · Ask for things that are out of your reach, such as your glasses, hearing aids, telephone, bedside table, or call button.  Follow these tips to avoid falling:  · Stay lying or seated, rather than standing, while waiting for help.  · Wear rubber-soled slippers or shoes whenever you walk in the hospital.  · Avoid quick, sudden movements.  ¨ Change positions slowly.  ¨ Sit on the side of your bed before standing.  ¨ Stand up slowly and wait before you start to walk.  · Let your health care provider know if there is a spill on the floor.  · Pay careful attention to the medical equipment, electrical cords, and tubes around you.  · When you need help, use your call button by your bed or in the bathroom. Wait for one of your health care providers to help you.  · If you feel dizzy or unsure of your footing, return to bed and wait for assistance.  · Avoid being distracted by the TV, telephone, or another person in your room.  · Do not lean or support yourself on rolling objects, such as IV poles or bedside tables.     This information is not intended to replace advice given to you by your health care provider. Make sure you discuss any  questions you have with your health care provider.     Document Released: 12/15/2001 Document Revised: 01/08/2016 Document Reviewed: 08/25/2013  [employer] Interactive Patient Education ©2016 [employer]         [facility]  CHG 4% Patient Instruction Sheet    Chlorhexidine Before Surgery  Chlorhexidine gluconate (CHG) is a germ-killing (antiseptic) solution that is used to clean the skin. It gets rid of the bacteria that normally live on the skin. Cleaning your skin with CHG before surgery helps lower the risk for infection after surgery.     How to use CHG solution  · You will take 2 showers, one shower the night before surgery, the second shower the morning of surgery before coming to the hospital.  · Use CHG only as told by your health care provider, and follow the instructions on the label.  · Use CHG solution while taking a shower. Follow these steps when using CHG solution (unless your health care provider gives you different instructions):  1. Start the shower.  2. Use your normal soap and shampoo to wash your face and hair.  3. Turn off the shower or move out of the shower stream.  4. Pour the CHG onto a clean washcloth. Do not use any type of brush or rough-edged sponge.  5. Starting at your neck, lather your body down to your toes. Make sure you:  6. Pay special attention to the part of your body where you will be having surgery. Scrub this area for at least 1 minute.  7. Use the full amount of CHG as directed. Usually, this is one half bottle for each shower.  8. Do not use CHG on your head or face. If the solution gets into your ears or eyes, rinse them well with water.  9. Avoid your genital area.  10. Avoid any areas of skin that have broken skin, cuts, or scrapes.  11. Scrub your back and under your arms. Make sure to wash skin folds.  12. Let the lather sit on your skin for 1-2 minutes or as long as told by your health care provider.  13. Thoroughly rinse your entire body in the shower.  Make sure that all body creases and crevices are rinsed well.  14. Dry off with a clean towel. Do not put any substances on your body afterward, such as powder, lotion, or perfume.  15. Put on clean clothes or pajamas.  16. If it is the night before your surgery, sleep in clean sheets.    What are the risks?  Risks of using CHG include:  · A skin reaction.  · Hearing loss, if CHG gets in your ears.  · Eye injury, if CHG gets in your eyes and is not rinsed out.  · The CHG product catching fire.  Make sure that you avoid smoking and flames after applying CHG to your skin.  Do not use CHG:  · If you have a chlorhexidine allergy or have previously reacted to chlorhexidine.  · On babies younger than 2 months of age.      On the day of surgery, when you are taken to your room in Outpatient Surgery you will be given a CHG prepackaged cloth to wipe the site for your surgery.  How to use CHG prepackaged cloths  · Follow the instructions on the label.  · Use the CHG cloth on clean, dry skin. Follow these steps when using a CHG cloth(unless your health care provider gives you different instructions):  1. Using the CHG cloth, vigorously scrub the part of your body where you will be having surgery. Scrub using a back-and-forth motion for 3 minutes. The area on your body should be completely wet with CHG when you are finished scrubbing.  2. Do not rinse. Discard the cloth and let the area air-dry for 1 minute. Do not put any substances on your body afterward, such as powder, lotion, or perfume.  Contact a health care provider if:  · Your skin gets irritated after scrubbing.  · You have questions about using your solution or cloth.  Get help right away if:  · Your eyes become very red or swollen.  · Your eyes itch badly.  · Your skin itches badly and is red or swollen.  · Your hearing changes.  · You have trouble seeing.  · You have swelling or tingling in your mouth or throat.  · You have trouble breathing.  · You swallow any  chlorhexidine.  Summary  · Chlorhexidine gluconate (CHG) is a germ-killing (antiseptic) solution that is used to clean the skin. Cleaning your skin with CHG before surgery helps lower the risk for infection after surgery.  · You may be given CHG to use at home. It may be in a bottle or in a prepackaged cloth to use on your skin. Carefully follow your health care provider's instructions and the instructions on the product label.  · Do not use CHG if you have a chlorhexidine allergy.  · Contact your health care provider if your skin gets irritated after scrubbing.  This information is not intended to replace advice given to you by your health care provider. Make sure you discuss any questions you have with your health care provider.  Document Released: 09/11/2013 Document Revised: 11/15/2018 Document Reviewed: 11/15/2018  [employer] Interactive Patient Education © 2019 [employer]        PATIENT/FAMILY/RESPONSIBLE PARTY VERBALIZES UNDERSTANDING OF ABOVE EDUCATION.  COPY OF PAIN SCALE GIVEN AND REVIEWED WITH VERBALIZED UNDERSTANDING.

## 2020-09-12 LAB
COTININE UR-MCNC: NEGATIVE NG/ML
Lab: NORMAL

## 2020-09-14 ENCOUNTER — LAB (OUTPATIENT)
Dept: LAB | Facility: HOSPITAL | Age: 25
End: 2020-09-14

## 2020-09-14 PROCEDURE — C9803 HOPD COVID-19 SPEC COLLECT: HCPCS | Performed by: SURGERY

## 2020-09-14 PROCEDURE — U0003 INFECTIOUS AGENT DETECTION BY NUCLEIC ACID (DNA OR RNA); SEVERE ACUTE RESPIRATORY SYNDROME CORONAVIRUS 2 (SARS-COV-2) (CORONAVIRUS DISEASE [COVID-19]), AMPLIFIED PROBE TECHNIQUE, MAKING USE OF HIGH THROUGHPUT TECHNOLOGIES AS DESCRIBED BY CMS-2020-01-R: HCPCS | Performed by: SURGERY

## 2020-09-15 LAB
COVID LABCORP PRIORITY: NORMAL
SARS-COV-2 RNA RESP QL NAA+PROBE: NOT DETECTED

## 2020-09-16 ENCOUNTER — ANESTHESIA EVENT (OUTPATIENT)
Dept: PERIOP | Facility: HOSPITAL | Age: 25
End: 2020-09-16

## 2020-09-17 ENCOUNTER — ANESTHESIA (OUTPATIENT)
Dept: PERIOP | Facility: HOSPITAL | Age: 25
End: 2020-09-17

## 2020-09-17 ENCOUNTER — HOSPITAL ENCOUNTER (INPATIENT)
Facility: HOSPITAL | Age: 25
LOS: 1 days | Discharge: HOME | End: 2020-09-18
Attending: SURGERY | Admitting: SURGERY

## 2020-09-17 DIAGNOSIS — K20.90 ESOPHAGITIS: ICD-10-CM

## 2020-09-17 DIAGNOSIS — Z01.818 ENCOUNTER FOR OTHER PREPROCEDURAL EXAMINATION: ICD-10-CM

## 2020-09-17 DIAGNOSIS — I10 ESSENTIAL HYPERTENSION: ICD-10-CM

## 2020-09-17 DIAGNOSIS — E66.01 CLASS 3 SEVERE OBESITY DUE TO EXCESS CALORIES WITH SERIOUS COMORBIDITY AND BODY MASS INDEX (BMI) OF 40.0 TO 44.9 IN ADULT (HCC): ICD-10-CM

## 2020-09-17 DIAGNOSIS — F17.210 TOBACCO DEPENDENCE DUE TO CIGARETTES: ICD-10-CM

## 2020-09-17 DIAGNOSIS — Z98.84 STATUS POST LAPAROSCOPIC SLEEVE GASTRECTOMY: Primary | ICD-10-CM

## 2020-09-17 LAB
ABO GROUP BLD: NORMAL
BLD GP AB SCN SERPL QL: NEGATIVE
RH BLD: POSITIVE
T&S EXPIRATION DATE: NORMAL

## 2020-09-17 PROCEDURE — 8E0W4CZ ROBOTIC ASSISTED PROCEDURE OF TRUNK REGION, PERCUTANEOUS ENDOSCOPIC APPROACH: ICD-10-PCS | Performed by: SURGERY

## 2020-09-17 PROCEDURE — 86850 RBC ANTIBODY SCREEN: CPT | Performed by: SURGERY

## 2020-09-17 PROCEDURE — 25010000002 METOCLOPRAMIDE PER 10 MG: Performed by: SURGERY

## 2020-09-17 PROCEDURE — 43775 LAP SLEEVE GASTRECTOMY: CPT | Performed by: SURGERY

## 2020-09-17 PROCEDURE — 25010000002 SUCCINYLCHOLINE PER 20 MG: Performed by: NURSE ANESTHETIST, CERTIFIED REGISTERED

## 2020-09-17 PROCEDURE — 86901 BLOOD TYPING SEROLOGIC RH(D): CPT | Performed by: SURGERY

## 2020-09-17 PROCEDURE — 25010000002 DEXAMETHASONE PER 1 MG: Performed by: NURSE ANESTHETIST, CERTIFIED REGISTERED

## 2020-09-17 PROCEDURE — 86900 BLOOD TYPING SEROLOGIC ABO: CPT | Performed by: SURGERY

## 2020-09-17 PROCEDURE — 25010000002 DEXAMETHASONE PER 1 MG: Performed by: ANESTHESIOLOGY

## 2020-09-17 PROCEDURE — 25010000002 CEFAZOLIN PER 500 MG: Performed by: SURGERY

## 2020-09-17 PROCEDURE — 25010000002 ENOXAPARIN PER 10 MG: Performed by: SURGERY

## 2020-09-17 PROCEDURE — 25010000002 KETOROLAC TROMETHAMINE PER 15 MG: Performed by: SURGERY

## 2020-09-17 PROCEDURE — 25010000002 LIDOCAINE PER 10 MG: Performed by: NURSE ANESTHETIST, CERTIFIED REGISTERED

## 2020-09-17 PROCEDURE — 25010000002 PROPOFOL 10 MG/ML EMULSION: Performed by: NURSE ANESTHETIST, CERTIFIED REGISTERED

## 2020-09-17 PROCEDURE — 25010000002 ONDANSETRON PER 1 MG: Performed by: SURGERY

## 2020-09-17 PROCEDURE — 25010000002 FOSAPREPITANT PER 1 MG: Performed by: SURGERY

## 2020-09-17 PROCEDURE — 88305 TISSUE EXAM BY PATHOLOGIST: CPT | Performed by: SURGERY

## 2020-09-17 PROCEDURE — 25010000002 ONDANSETRON PER 1 MG: Performed by: NURSE ANESTHETIST, CERTIFIED REGISTERED

## 2020-09-17 PROCEDURE — 94799 UNLISTED PULMONARY SVC/PX: CPT

## 2020-09-17 PROCEDURE — 25010000002 PHENYLEPHRINE HCL 0.8 MG/10ML SOLUTION PREFILLED SYRINGE: Performed by: NURSE ANESTHETIST, CERTIFIED REGISTERED

## 2020-09-17 PROCEDURE — 25010000002 KETOROLAC TROMETHAMINE PER 15 MG: Performed by: NURSE ANESTHETIST, CERTIFIED REGISTERED

## 2020-09-17 PROCEDURE — 25010000003 LIDOCAINE 1 % SOLUTION 20 ML VIAL: Performed by: SURGERY

## 2020-09-17 PROCEDURE — 0DB64Z3 EXCISION OF STOMACH, PERCUTANEOUS ENDOSCOPIC APPROACH, VERTICAL: ICD-10-PCS | Performed by: SURGERY

## 2020-09-17 DEVICE — STAPLER 60 RELOAD BLUE
Type: IMPLANTABLE DEVICE | Site: STOMACH | Status: FUNCTIONAL
Brand: SUREFORM

## 2020-09-17 DEVICE — STAPLER 60 RELOAD WHITE
Type: IMPLANTABLE DEVICE | Site: STOMACH | Status: FUNCTIONAL
Brand: SUREFORM

## 2020-09-17 DEVICE — SEALANT WND FIBRIN TISSEEL PREFIL/SYR/PRIMAFZ 4ML: Type: IMPLANTABLE DEVICE | Site: STOMACH | Status: FUNCTIONAL

## 2020-09-17 RX ORDER — LIDOCAINE HYDROCHLORIDE ANHYDROUS AND DEXTROSE MONOHYDRATE 5; 400 G/100ML; MG/100ML
INJECTION, SOLUTION INTRAVENOUS CONTINUOUS PRN
Status: DISCONTINUED | OUTPATIENT
Start: 2020-09-17 | End: 2020-09-17 | Stop reason: SURG

## 2020-09-17 RX ORDER — SODIUM CHLORIDE 0.9 % (FLUSH) 0.9 %
3 SYRINGE (ML) INJECTION EVERY 12 HOURS SCHEDULED
Status: DISCONTINUED | OUTPATIENT
Start: 2020-09-17 | End: 2020-09-17 | Stop reason: HOSPADM

## 2020-09-17 RX ORDER — ACETAMINOPHEN 160 MG/5ML
150 SOLUTION ORAL EVERY 4 HOURS
Status: DISCONTINUED | OUTPATIENT
Start: 2020-09-17 | End: 2020-09-18 | Stop reason: HOSPADM

## 2020-09-17 RX ORDER — TRAMADOL HYDROCHLORIDE 50 MG/1
50 TABLET ORAL EVERY 6 HOURS PRN
Status: DISCONTINUED | OUTPATIENT
Start: 2020-09-17 | End: 2020-09-18 | Stop reason: HOSPADM

## 2020-09-17 RX ORDER — SODIUM CHLORIDE 9 MG/ML
INJECTION, SOLUTION INTRAVENOUS AS NEEDED
Status: DISCONTINUED | OUTPATIENT
Start: 2020-09-17 | End: 2020-09-17 | Stop reason: HOSPADM

## 2020-09-17 RX ORDER — DIPHENHYDRAMINE HYDROCHLORIDE 50 MG/ML
25 INJECTION INTRAMUSCULAR; INTRAVENOUS EVERY 6 HOURS PRN
Status: DISCONTINUED | OUTPATIENT
Start: 2020-09-17 | End: 2020-09-18 | Stop reason: HOSPADM

## 2020-09-17 RX ORDER — METOCLOPRAMIDE HYDROCHLORIDE 5 MG/ML
5 INJECTION INTRAMUSCULAR; INTRAVENOUS EVERY 6 HOURS
Status: DISCONTINUED | OUTPATIENT
Start: 2020-09-17 | End: 2020-09-18 | Stop reason: HOSPADM

## 2020-09-17 RX ORDER — KETOROLAC TROMETHAMINE 30 MG/ML
INJECTION, SOLUTION INTRAMUSCULAR; INTRAVENOUS AS NEEDED
Status: DISCONTINUED | OUTPATIENT
Start: 2020-09-17 | End: 2020-09-17 | Stop reason: SURG

## 2020-09-17 RX ORDER — FENTANYL CITRATE 50 UG/ML
25 INJECTION, SOLUTION INTRAMUSCULAR; INTRAVENOUS
Status: DISCONTINUED | OUTPATIENT
Start: 2020-09-17 | End: 2020-09-17 | Stop reason: HOSPADM

## 2020-09-17 RX ORDER — ONDANSETRON 2 MG/ML
4 INJECTION INTRAMUSCULAR; INTRAVENOUS EVERY 6 HOURS
Status: DISCONTINUED | OUTPATIENT
Start: 2020-09-17 | End: 2020-09-18 | Stop reason: HOSPADM

## 2020-09-17 RX ORDER — DEXAMETHASONE SODIUM PHOSPHATE 4 MG/ML
4 INJECTION, SOLUTION INTRA-ARTICULAR; INTRALESIONAL; INTRAMUSCULAR; INTRAVENOUS; SOFT TISSUE ONCE AS NEEDED
Status: COMPLETED | OUTPATIENT
Start: 2020-09-17 | End: 2020-09-17

## 2020-09-17 RX ORDER — KETOROLAC TROMETHAMINE 30 MG/ML
30 INJECTION, SOLUTION INTRAMUSCULAR; INTRAVENOUS EVERY 6 HOURS PRN
Status: DISCONTINUED | OUTPATIENT
Start: 2020-09-17 | End: 2020-09-18 | Stop reason: HOSPADM

## 2020-09-17 RX ORDER — SODIUM CHLORIDE 0.9 % (FLUSH) 0.9 %
3 SYRINGE (ML) INJECTION EVERY 12 HOURS SCHEDULED
Status: DISCONTINUED | OUTPATIENT
Start: 2020-09-17 | End: 2020-09-18 | Stop reason: HOSPADM

## 2020-09-17 RX ORDER — LIDOCAINE HYDROCHLORIDE 40 MG/ML
SOLUTION TOPICAL AS NEEDED
Status: DISCONTINUED | OUTPATIENT
Start: 2020-09-17 | End: 2020-09-17 | Stop reason: SURG

## 2020-09-17 RX ORDER — SODIUM CHLORIDE 0.9 % (FLUSH) 0.9 %
10 SYRINGE (ML) INJECTION AS NEEDED
Status: DISCONTINUED | OUTPATIENT
Start: 2020-09-17 | End: 2020-09-17 | Stop reason: HOSPADM

## 2020-09-17 RX ORDER — SODIUM CHLORIDE, SODIUM LACTATE, POTASSIUM CHLORIDE, CALCIUM CHLORIDE 600; 310; 30; 20 MG/100ML; MG/100ML; MG/100ML; MG/100ML
75 INJECTION, SOLUTION INTRAVENOUS CONTINUOUS
Status: DISCONTINUED | OUTPATIENT
Start: 2020-09-17 | End: 2020-09-18 | Stop reason: HOSPADM

## 2020-09-17 RX ORDER — MAGNESIUM HYDROXIDE 1200 MG/15ML
LIQUID ORAL AS NEEDED
Status: DISCONTINUED | OUTPATIENT
Start: 2020-09-17 | End: 2020-09-17 | Stop reason: HOSPADM

## 2020-09-17 RX ORDER — ONDANSETRON 2 MG/ML
4 INJECTION INTRAMUSCULAR; INTRAVENOUS ONCE AS NEEDED
Status: DISCONTINUED | OUTPATIENT
Start: 2020-09-17 | End: 2020-09-17 | Stop reason: HOSPADM

## 2020-09-17 RX ORDER — ONDANSETRON 2 MG/ML
4 INJECTION INTRAMUSCULAR; INTRAVENOUS EVERY 6 HOURS PRN
Status: DISCONTINUED | OUTPATIENT
Start: 2020-09-17 | End: 2020-09-17 | Stop reason: HOSPADM

## 2020-09-17 RX ORDER — CEFAZOLIN SODIUM IN 0.9 % NACL 3 G/100 ML
3 INTRAVENOUS SOLUTION, PIGGYBACK (ML) INTRAVENOUS ONCE
Status: COMPLETED | OUTPATIENT
Start: 2020-09-17 | End: 2020-09-17

## 2020-09-17 RX ORDER — DEXAMETHASONE SODIUM PHOSPHATE 4 MG/ML
INJECTION, SOLUTION INTRA-ARTICULAR; INTRALESIONAL; INTRAMUSCULAR; INTRAVENOUS; SOFT TISSUE AS NEEDED
Status: DISCONTINUED | OUTPATIENT
Start: 2020-09-17 | End: 2020-09-17 | Stop reason: SURG

## 2020-09-17 RX ORDER — FLUMAZENIL 0.1 MG/ML
0.2 INJECTION INTRAVENOUS AS NEEDED
Status: DISCONTINUED | OUTPATIENT
Start: 2020-09-17 | End: 2020-09-17 | Stop reason: HOSPADM

## 2020-09-17 RX ORDER — BUPIVACAINE HCL/0.9 % NACL/PF 0.1 %
2 PLASTIC BAG, INJECTION (ML) EPIDURAL EVERY 8 HOURS
Status: COMPLETED | OUTPATIENT
Start: 2020-09-17 | End: 2020-09-18

## 2020-09-17 RX ORDER — NALOXONE HCL 0.4 MG/ML
0.4 VIAL (ML) INJECTION AS NEEDED
Status: DISCONTINUED | OUTPATIENT
Start: 2020-09-17 | End: 2020-09-17 | Stop reason: HOSPADM

## 2020-09-17 RX ORDER — LIDOCAINE HYDROCHLORIDE 10 MG/ML
0.5 INJECTION, SOLUTION EPIDURAL; INFILTRATION; INTRACAUDAL; PERINEURAL ONCE AS NEEDED
Status: DISCONTINUED | OUTPATIENT
Start: 2020-09-17 | End: 2020-09-17 | Stop reason: HOSPADM

## 2020-09-17 RX ORDER — GABAPENTIN 250 MG/5ML
250 SOLUTION ORAL ONCE
Status: COMPLETED | OUTPATIENT
Start: 2020-09-17 | End: 2020-09-17

## 2020-09-17 RX ORDER — SODIUM CHLORIDE 0.9 % (FLUSH) 0.9 %
1-10 SYRINGE (ML) INJECTION AS NEEDED
Status: DISCONTINUED | OUTPATIENT
Start: 2020-09-17 | End: 2020-09-18 | Stop reason: HOSPADM

## 2020-09-17 RX ORDER — SIMETHICONE 80 MG
40 TABLET,CHEWABLE ORAL 4 TIMES DAILY PRN
Status: DISCONTINUED | OUTPATIENT
Start: 2020-09-17 | End: 2020-09-18 | Stop reason: HOSPADM

## 2020-09-17 RX ORDER — MIDAZOLAM HYDROCHLORIDE 1 MG/ML
1 INJECTION INTRAMUSCULAR; INTRAVENOUS
Status: DISCONTINUED | OUTPATIENT
Start: 2020-09-17 | End: 2020-09-17 | Stop reason: HOSPADM

## 2020-09-17 RX ORDER — HYDROMORPHONE HYDROCHLORIDE 1 MG/ML
0.5 INJECTION, SOLUTION INTRAMUSCULAR; INTRAVENOUS; SUBCUTANEOUS EVERY 4 HOURS PRN
Status: DISCONTINUED | OUTPATIENT
Start: 2020-09-17 | End: 2020-09-18

## 2020-09-17 RX ORDER — SODIUM CHLORIDE 0.9 % (FLUSH) 0.9 %
3-10 SYRINGE (ML) INJECTION AS NEEDED
Status: DISCONTINUED | OUTPATIENT
Start: 2020-09-17 | End: 2020-09-17 | Stop reason: HOSPADM

## 2020-09-17 RX ORDER — SUCCINYLCHOLINE CHLORIDE 20 MG/ML
INJECTION INTRAMUSCULAR; INTRAVENOUS AS NEEDED
Status: DISCONTINUED | OUTPATIENT
Start: 2020-09-17 | End: 2020-09-17 | Stop reason: SURG

## 2020-09-17 RX ORDER — LABETALOL HYDROCHLORIDE 5 MG/ML
5 INJECTION, SOLUTION INTRAVENOUS
Status: DISCONTINUED | OUTPATIENT
Start: 2020-09-17 | End: 2020-09-17 | Stop reason: HOSPADM

## 2020-09-17 RX ORDER — SODIUM CHLORIDE, SODIUM LACTATE, POTASSIUM CHLORIDE, CALCIUM CHLORIDE 600; 310; 30; 20 MG/100ML; MG/100ML; MG/100ML; MG/100ML
100 INJECTION, SOLUTION INTRAVENOUS CONTINUOUS
Status: DISCONTINUED | OUTPATIENT
Start: 2020-09-17 | End: 2020-09-17 | Stop reason: HOSPADM

## 2020-09-17 RX ORDER — SODIUM CHLORIDE, SODIUM LACTATE, POTASSIUM CHLORIDE, CALCIUM CHLORIDE 600; 310; 30; 20 MG/100ML; MG/100ML; MG/100ML; MG/100ML
1000 INJECTION, SOLUTION INTRAVENOUS CONTINUOUS
Status: DISCONTINUED | OUTPATIENT
Start: 2020-09-17 | End: 2020-09-17 | Stop reason: HOSPADM

## 2020-09-17 RX ORDER — ROCURONIUM BROMIDE 10 MG/ML
INJECTION, SOLUTION INTRAVENOUS AS NEEDED
Status: DISCONTINUED | OUTPATIENT
Start: 2020-09-17 | End: 2020-09-17 | Stop reason: SURG

## 2020-09-17 RX ORDER — NEOSTIGMINE METHYLSULFATE 5 MG/5 ML
SYRINGE (ML) INTRAVENOUS AS NEEDED
Status: DISCONTINUED | OUTPATIENT
Start: 2020-09-17 | End: 2020-09-17 | Stop reason: SURG

## 2020-09-17 RX ORDER — ACETAMINOPHEN 500 MG
1000 TABLET ORAL EVERY 6 HOURS PRN
Status: ON HOLD | COMMUNITY
End: 2020-09-17

## 2020-09-17 RX ORDER — SCOLOPAMINE TRANSDERMAL SYSTEM 1 MG/1
1 PATCH, EXTENDED RELEASE TRANSDERMAL
Status: DISCONTINUED | OUTPATIENT
Start: 2020-09-17 | End: 2020-09-17 | Stop reason: HOSPADM

## 2020-09-17 RX ORDER — DEXAMETHASONE SODIUM PHOSPHATE 4 MG/ML
4 INJECTION, SOLUTION INTRA-ARTICULAR; INTRALESIONAL; INTRAMUSCULAR; INTRAVENOUS; SOFT TISSUE EVERY 8 HOURS PRN
Status: DISCONTINUED | OUTPATIENT
Start: 2020-09-17 | End: 2020-09-18 | Stop reason: HOSPADM

## 2020-09-17 RX ORDER — ONDANSETRON 2 MG/ML
INJECTION INTRAMUSCULAR; INTRAVENOUS AS NEEDED
Status: DISCONTINUED | OUTPATIENT
Start: 2020-09-17 | End: 2020-09-17 | Stop reason: SURG

## 2020-09-17 RX ORDER — DEXMEDETOMIDINE HYDROCHLORIDE 100 UG/ML
INJECTION, SOLUTION INTRAVENOUS AS NEEDED
Status: DISCONTINUED | OUTPATIENT
Start: 2020-09-17 | End: 2020-09-17 | Stop reason: SURG

## 2020-09-17 RX ORDER — SODIUM CHLORIDE 0.9 % (FLUSH) 0.9 %
3 SYRINGE (ML) INJECTION AS NEEDED
Status: DISCONTINUED | OUTPATIENT
Start: 2020-09-17 | End: 2020-09-17 | Stop reason: HOSPADM

## 2020-09-17 RX ORDER — KETAMINE HYDROCHLORIDE 50 MG/ML
INJECTION, SOLUTION, CONCENTRATE INTRAMUSCULAR; INTRAVENOUS AS NEEDED
Status: DISCONTINUED | OUTPATIENT
Start: 2020-09-17 | End: 2020-09-17 | Stop reason: SURG

## 2020-09-17 RX ORDER — PROPOFOL 10 MG/ML
VIAL (ML) INTRAVENOUS AS NEEDED
Status: DISCONTINUED | OUTPATIENT
Start: 2020-09-17 | End: 2020-09-17 | Stop reason: SURG

## 2020-09-17 RX ORDER — PHENYLEPHRINE HCL IN 0.9% NACL 0.8MG/10ML
SYRINGE (ML) INTRAVENOUS AS NEEDED
Status: DISCONTINUED | OUTPATIENT
Start: 2020-09-17 | End: 2020-09-17 | Stop reason: SURG

## 2020-09-17 RX ADMIN — LIDOCAINE HYDROCHLORIDE 100 MG: 20 INJECTION, SOLUTION INTRAVENOUS at 10:35

## 2020-09-17 RX ADMIN — DEXAMETHASONE SODIUM PHOSPHATE 4 MG: 4 INJECTION, SOLUTION INTRAMUSCULAR; INTRAVENOUS at 09:38

## 2020-09-17 RX ADMIN — Medication 160 MCG: at 10:40

## 2020-09-17 RX ADMIN — VASOPRESSIN 0.5 ML: 20 INJECTION INTRAVENOUS at 11:00

## 2020-09-17 RX ADMIN — LIDOCAINE HYDROCHLORIDE 1 EACH: 40 SOLUTION TOPICAL at 09:56

## 2020-09-17 RX ADMIN — SODIUM CHLORIDE, POTASSIUM CHLORIDE, SODIUM LACTATE AND CALCIUM CHLORIDE 1000 ML: 600; 310; 30; 20 INJECTION, SOLUTION INTRAVENOUS at 09:07

## 2020-09-17 RX ADMIN — SODIUM CHLORIDE, POTASSIUM CHLORIDE, SODIUM LACTATE AND CALCIUM CHLORIDE 140 ML/HR: 600; 310; 30; 20 INJECTION, SOLUTION INTRAVENOUS at 23:41

## 2020-09-17 RX ADMIN — ACETAMINOPHEN 150 MG: 160 SUSPENSION ORAL at 18:07

## 2020-09-17 RX ADMIN — ACETAMINOPHEN 150 MG: 160 SUSPENSION ORAL at 14:40

## 2020-09-17 RX ADMIN — LIDOCAINE HYDROCHLORIDE 2 MG/MIN: 4 INJECTION, SOLUTION INTRAVENOUS at 10:00

## 2020-09-17 RX ADMIN — GLYCOPYRROLATE 0.2 MG: 0.2 INJECTION, SOLUTION INTRAMUSCULAR; INTRAVENOUS at 09:54

## 2020-09-17 RX ADMIN — METOCLOPRAMIDE 5 MG: 5 INJECTION, SOLUTION INTRAMUSCULAR; INTRAVENOUS at 20:08

## 2020-09-17 RX ADMIN — SODIUM CHLORIDE, POTASSIUM CHLORIDE, SODIUM LACTATE AND CALCIUM CHLORIDE 1000 ML: 600; 310; 30; 20 INJECTION, SOLUTION INTRAVENOUS at 09:06

## 2020-09-17 RX ADMIN — SUCCINYLCHOLINE CHLORIDE 160 MG: 20 INJECTION, SOLUTION INTRAMUSCULAR; INTRAVENOUS at 09:56

## 2020-09-17 RX ADMIN — Medication 160 MCG: at 11:14

## 2020-09-17 RX ADMIN — ONDANSETRON HYDROCHLORIDE 4 MG: 2 SOLUTION INTRAMUSCULAR; INTRAVENOUS at 18:07

## 2020-09-17 RX ADMIN — VASOPRESSIN 0.5 ML: 20 INJECTION INTRAVENOUS at 10:54

## 2020-09-17 RX ADMIN — KETAMINE HYDROCHLORIDE 100 MG: 50 INJECTION, SOLUTION INTRAMUSCULAR; INTRAVENOUS at 10:00

## 2020-09-17 RX ADMIN — GABAPENTIN 250 MG: 250 SUSPENSION ORAL at 09:15

## 2020-09-17 RX ADMIN — SODIUM CHLORIDE 150 MG: 9 INJECTION, SOLUTION INTRAVENOUS at 11:42

## 2020-09-17 RX ADMIN — KETOROLAC TROMETHAMINE 30 MG: 30 INJECTION, SOLUTION INTRAMUSCULAR at 15:38

## 2020-09-17 RX ADMIN — Medication 160 MCG: at 10:21

## 2020-09-17 RX ADMIN — GLYCOPYRROLATE 0.4 MG: 0.2 INJECTION, SOLUTION INTRAMUSCULAR; INTRAVENOUS at 11:42

## 2020-09-17 RX ADMIN — ONDANSETRON HYDROCHLORIDE 4 MG: 2 SOLUTION INTRAMUSCULAR; INTRAVENOUS at 23:42

## 2020-09-17 RX ADMIN — SCOPALAMINE 1 PATCH: 1 PATCH, EXTENDED RELEASE TRANSDERMAL at 09:34

## 2020-09-17 RX ADMIN — VASOPRESSIN 1 ML: 20 INJECTION INTRAVENOUS at 10:47

## 2020-09-17 RX ADMIN — PROPOFOL 150 MG: 10 INJECTION, EMULSION INTRAVENOUS at 09:56

## 2020-09-17 RX ADMIN — ROCURONIUM BROMIDE 10 MG: 10 INJECTION INTRAVENOUS at 09:56

## 2020-09-17 RX ADMIN — ACETAMINOPHEN 150 MG: 160 SUSPENSION ORAL at 23:41

## 2020-09-17 RX ADMIN — LIDOCAINE HYDROCHLORIDE 100 MG: 20 INJECTION, SOLUTION INTRAVENOUS at 10:15

## 2020-09-17 RX ADMIN — SODIUM CHLORIDE, PRESERVATIVE FREE 3 ML: 5 INJECTION INTRAVENOUS at 14:42

## 2020-09-17 RX ADMIN — CEFAZOLIN 3 G: 1 INJECTION, POWDER, FOR SOLUTION INTRAMUSCULAR; INTRAVENOUS; PARENTERAL at 10:00

## 2020-09-17 RX ADMIN — KETOROLAC TROMETHAMINE 60 MG: 60 INJECTION, SOLUTION INTRAMUSCULAR at 11:44

## 2020-09-17 RX ADMIN — Medication 4 MG: at 11:41

## 2020-09-17 RX ADMIN — LIDOCAINE HYDROCHLORIDE 100 MG: 20 INJECTION, SOLUTION INTRAVENOUS at 09:56

## 2020-09-17 RX ADMIN — PROPOFOL 100 MCG/KG/MIN: 10 INJECTION, EMULSION INTRAVENOUS at 09:56

## 2020-09-17 RX ADMIN — DEXAMETHASONE SODIUM PHOSPHATE 8 MG: 4 INJECTION, SOLUTION INTRAMUSCULAR; INTRAVENOUS at 10:17

## 2020-09-17 RX ADMIN — ROCURONIUM BROMIDE 65 MG: 10 INJECTION INTRAVENOUS at 10:05

## 2020-09-17 RX ADMIN — PROPOFOL 50 MG: 10 INJECTION, EMULSION INTRAVENOUS at 11:45

## 2020-09-17 RX ADMIN — METOCLOPRAMIDE 5 MG: 5 INJECTION, SOLUTION INTRAMUSCULAR; INTRAVENOUS at 15:36

## 2020-09-17 RX ADMIN — SODIUM CHLORIDE, POTASSIUM CHLORIDE, SODIUM LACTATE AND CALCIUM CHLORIDE 140 ML/HR: 600; 310; 30; 20 INJECTION, SOLUTION INTRAVENOUS at 14:40

## 2020-09-17 RX ADMIN — Medication 160 MCG: at 10:35

## 2020-09-17 RX ADMIN — Medication 240 MCG: at 11:00

## 2020-09-17 RX ADMIN — ENOXAPARIN SODIUM 40 MG: 40 INJECTION SUBCUTANEOUS at 09:34

## 2020-09-17 RX ADMIN — DEXMEDETOMIDINE 100 MCG: 100 INJECTION, SOLUTION, CONCENTRATE INTRAVENOUS at 09:55

## 2020-09-17 RX ADMIN — TRAMADOL HYDROCHLORIDE 50 MG: 50 TABLET, FILM COATED ORAL at 20:06

## 2020-09-17 RX ADMIN — Medication 160 MCG: at 10:44

## 2020-09-17 RX ADMIN — SODIUM CHLORIDE, POTASSIUM CHLORIDE, SODIUM LACTATE AND CALCIUM CHLORIDE 500 ML: 600; 310; 30; 20 INJECTION, SOLUTION INTRAVENOUS at 09:06

## 2020-09-17 RX ADMIN — ONDANSETRON HYDROCHLORIDE 8 MG: 2 SOLUTION INTRAMUSCULAR; INTRAVENOUS at 11:25

## 2020-09-17 RX ADMIN — Medication 160 MCG: at 11:22

## 2020-09-17 RX ADMIN — DEXMEDETOMIDINE 100 MCG: 100 INJECTION, SOLUTION, CONCENTRATE INTRAVENOUS at 10:02

## 2020-09-17 RX ADMIN — SODIUM CHLORIDE, PRESERVATIVE FREE 10 ML: 5 INJECTION INTRAVENOUS at 20:08

## 2020-09-17 RX ADMIN — Medication 40 MG: at 18:09

## 2020-09-17 RX ADMIN — CEFAZOLIN SODIUM 2 G: 10 INJECTION, POWDER, FOR SOLUTION INTRAVENOUS at 18:07

## 2020-09-17 NOTE — ANESTHESIA PROCEDURE NOTES
Airway  Urgency: elective    Airway not difficult    General Information and Staff    Patient location during procedure: OR  CRNA: ALLA Anderson CRNA    Indications and Patient Condition  Indications for airway management: airway protection    Preoxygenated: yes  MILS maintained throughout  Mask difficulty assessment: 1 - vent by mask    Final Airway Details  Final airway type: endotracheal airway      Successful airway: ETT  Cuffed: yes   Successful intubation technique: direct laryngoscopy and video laryngoscopy  Facilitating devices/methods: intubating stylet  Endotracheal tube insertion site: oral  Blade: Turner  Blade size: 3  ETT size (mm): 7.5  Cormack-Lehane Classification: grade I - full view of glottis  Placement verified by: chest auscultation and capnometry   Cuff volume (mL): 6  Measured from: lips  ETT/EBT  to lips (cm): 21  Number of attempts at approach: 1  Assessment: lips, teeth, and gum same as pre-op and atraumatic intubation

## 2020-09-17 NOTE — PLAN OF CARE
Goal Outcome Evaluation:  Plan of Care Reviewed With: patient  Progress: no change  Outcome Summary: Patient admitted to  from OR s/p gastric sleeve today, dermabond lap x5, voiding per BRP, ambulating in hallways, IVF infusing, IV abx as ordered, c/o pain see MAR, no current c.o n/v, tolerating ice chips, will continue to monitor.

## 2020-09-17 NOTE — OP NOTE
Robotic assisted laparoscopic sleeve Gastrectomy     Pre operative diagnosis: Body mass index is 41.92 kg/m²., associated comorbidities of  HTN    Post op diagnosis: as above      Indications: The patient was admitted to the hospital with history of morbid obesity with a Body mass index is 41.92 kg/m²..   she is scheduled for a Laparoscopic Sleeve Gastrectomy.       Surgeon: Charlie Escamilla MD     Assistants:  Circulator: Marvin Bobby RN; Finn uPlido RN  Scrub Person: Shea Perales; Maris Lal; Sheri Blanchard    Anesthesia: General endotracheal anesthesia    ASA Class: 3      Procedure Details         After the patient was explained the alternatives, benefits, complications, and risks of the robotic assisted laparoscopic sleeve gastrectomy informed consent was provided.  The patient was brought to the OR suite after receiving preoperative antibiotics medications and anticoagulation.  The patient was placed under general anesthesia.  The patient was then prepped and draped in standard fashion.  We performed a surgical Timeout.  A 40 Mosotho bougie was placed into the oropharynx by anesthesia followed by placement to suction.  I then proceeded to locally anesthetize Left upper quadrant site for placement of a 8 mm incision which was followed by placement of a 8 mm trocar into the abdominal cavity with camera assist.  I insufflated the abdominal cavity to a pressure of 15 mmHg.  An 8 mm incision was made in the periumbilical midline location for placement of an 8 mm trocar under direct visualization, location approximately 28 cm from the sternal notch.  An 8 mm trocar was placed on the patient's right upper quadrant lateral to the periumbilical trocar under direct visualization as well as a 12 mm trocar lateral to this site along the same plane under direct visualization.  An additional 8 mm trocar was placed in the left upper quadrant under direct visualization in similar fashion.   A  5 mm incision was placed in the subxiphoid location for placement of a 5 mm trocar under direct visualization.  I removed this trocar and replaced it with a Austin liver retractor.  Once adequate exposure of the Hiatus and stomach was obtained, I then docked the robot to the trochars.  My robotic instruments were then placed under direct visualization into the surgical field.  I then went to the robotic console and began the procedure.  First I proceeded with dissecting the fat pad near the angle of Hiss away from the diaphragm.  I then proceeded with ligating the greater curvature vessels starting mid greater curvature working my way distally and proximally where my most distal ligation site was 6 cm from the pylorus.  I continued proximally along the greater curvature ligating the vessels as well as a short gastrics.  This was all accomplished using the vessel sealer.  Once completion of ligation of the vessels was obtained. I dissected away attachments found posteriorly to the stomach.  Care was made to assure no injury to the pancreas. Completion of my dissection was obtained once visualization of the posterior left crural muscle was seen.  The sleeve gastrectomy was then created. I fired my most distal load first after the bougie was retracted proximally.  I then reinserted the bougie to fit into that small portion of the stomach near the prepyloric location/antrum.  Then completed the firing of the stapler device to create the sleeve gastrectomy hugging the bougie.  My most distal staple line was approximately 6 cm from the pylorus and I continued proximally along the greater curvature and assuring that the width from the angularis incisura was at least 3 cm.  My final proximal stapler was at least 1-2 cm from the GE junction.  I then assessed the staple line to observe for hemostasis.  Once hemostasis was confirmed I then proceeded with my leak test.  Formed utilizing air which was insufflated into the  sleeve by the bougie and submerging the staple line under saline.  I observed for any evidence of bubbles or leakage.  There was no evidence of leakage or bubbles noted and I then proceeded with suctioning out the air from the sleeve.  Once there was no evidence of leakage I then requested that the bougie be placed off suction, given a small puff and removed under direct visualization.    I placed Tisseel on the staple line under direct visualization.    I then pexied the omentum to the proximal portion of the sleeve with 2-0 absorbable suture.  I reassess for hemostasis and then proceeded to scrub back into the case for removal of the resected portion of the stomach after the robotic instruments were removed under direct visualization.  The resected portion of the stomach was removed from the 12 mm trocar site under direct visualization.    The 12 mm trocar site's fascia was reapproximated using a Mathieu Rankin device and an 0 Vicryl suture.   The liver retractor was removed under direct visualization as well as the pneumoperitoneum and remaining trochars.  Then re-locally anesthetized skin wounds for closure.  Skin wounds were closed with 4-0 Monocryl.  Prior to closing skin wounds all lap pads and attachments were accounted for at the end of the procedure.    Blue staples: 4  White staples:1      Findings: wnl    Estimated Blood Loss:  minimal                    Total IV Fluids: 800 ml           Specimens:   Specimens     ID Source Type Tests Collected By Collected At Frozen?      A Gastric, Fundus Tissue · TISSUE PATHOLOGY EXAM   Charlie Escamilla MD 9/17/20 1044 No     Description: Fundus of Stomach    This specimen was not marked as sent.                 Implants:   Implant Name Type Inv. Item Serial No.  Lot No. LRB No. Used Action   RELOAD STPLR SUREFORM 60 DAVINCI/X/XI 6ROW 3.5 JAKI 1P/U - [id number] Implant RELOAD STPLR SUREFORM 60 DAVINCI/X/XI 6ROW 3.5 JAKI 1P/U  [employer] [id number]  N/A 5 Implanted   SEAL FIBRIN TISSEEL FZ 4ML - [id number] Implant SEAL FIBRIN TISSEEL FZ 4ML  [employer] [id number] N/A 1 Implanted   RELOAD STPLR SUREFORM 60 DAVINCI/X/XI 6ROW 2.5 WHT 1P/U - [id number] Implant RELOAD STPLR SUREFORM 60 DAVINCI/X/XI 6ROW 2.5 WHT 1P/U  INTUITIVE SURGICAL [id number] N/A 1 Implanted              Complications:  none           Disposition: PACU - hemodynamically stable.           Condition: stable

## 2020-09-17 NOTE — ANESTHESIA POSTPROCEDURE EVALUATION
Patient: Katrin Friedman    Procedure Summary     Date: 09/17/20 Room / Location: [facility] OR  /  [facility] OR    Anesthesia Start: 0953 Anesthesia Stop: 1210    Procedure: GASTRIC SLEEVE LAPAROSCOPIC WITH DAVINCI ROBOT (N/A Abdomen) Diagnosis:       Class 3 severe obesity due to excess calories with serious comorbidity and body mass index (BMI) of 40.0 to 44.9 in adult (CMS/[facility], located within [facility])      Essential hypertension      Esophagitis      (Class 3 severe obesity due to excess calories with serious comorbidity and body mass index (BMI) of 40.0 to 44.9 in adult (CMS/HCC) [E66.01, Z68.41])      (Essential hypertension [I10])      (Esophagitis [K20.9])    Surgeon: Charlie Escamilla MD Provider: ALLA Anderson CRNA    Anesthesia Type: general ASA Status: 3          Anesthesia Type: general    Vitals  Vitals Value Taken Time   /63 09/17/20 1330   Temp 98 °F (36.7 °C) 09/17/20 1330   Pulse 76 09/17/20 1334   Resp 20 09/17/20 1330   SpO2 97 % 09/17/20 1334   Vitals shown include unvalidated device data.        Post Anesthesia Care and Evaluation    Patient location during evaluation: PACU  Patient participation: complete - patient participated  Level of consciousness: awake and alert  Pain management: adequate  Airway patency: patent  Anesthetic complications: No anesthetic complications  PONV Status: none  Cardiovascular status: acceptable and hemodynamically stable  Respiratory status: acceptable  Hydration status: acceptable    Comments: Blood pressure 113/63, pulse 75, temperature 98 °F (36.7 °C), temperature source Temporal, resp. rate 20, weight 117 kg (257 lb 11.5 oz), SpO2 97 %, not currently breastfeeding.    Patient discharged from PACU based upon Kartik score. Please see RN notes for further details

## 2020-09-17 NOTE — ANESTHESIA PREPROCEDURE EVALUATION
Anesthesia Evaluation     Patient summary reviewed   no history of anesthetic complications:  NPO Solid Status: > 8 hours  NPO Liquid Status: > 6 hours           Airway   Mallampati: III  TM distance: >3 FB  Neck ROM: full  Dental          Pulmonary    (+) a smoker Former,   (-) sleep apnea  Cardiovascular   Exercise tolerance: excellent (>7 METS)    ECG reviewed    (+) hypertension,   (-) past MI, CAD      Neuro/Psych- negative ROS  (-) seizures, TIA, CVA  GI/Hepatic/Renal/Endo    (+) morbid obesity, GERD,    (-) liver disease, no renal disease, diabetes    Musculoskeletal     Abdominal    Substance History      OB/GYN          Other                        Anesthesia Plan    ASA 3     general     intravenous induction     Anesthetic plan, all risks, benefits, and alternatives have been provided, discussed and informed consent has been obtained with: patient.

## 2020-09-17 NOTE — BRIEF OP NOTE
GASTRIC SLEEVE LAPAROSCOPIC WITH DAVINCI ROBOT  Progress Note    Katrin JASWINDER Friedman  9/17/2020    Pre-op Diagnosis:   Class 3 severe obesity due to excess calories with serious comorbidity and body mass index (BMI) of 40.0 to 44.9 in adult (CMS/[facility]) [E66.01, Z68.41]  Essential hypertension [I10]  Esophagitis [K20.9]       Post-Op Diagnosis Codes:     * Class 3 severe obesity due to excess calories with serious comorbidity and body mass index (BMI) of 40.0 to 44.9 in adult (CMS/[facility]) [E66.01, Z68.41]     * Essential hypertension [I10]     * Esophagitis [K20.9]    Procedure/CPT® Codes:        Procedure(s):  GASTRIC SLEEVE LAPAROSCOPIC WITH DAVINCI ROBOT    Surgeon(s):  Charlie Escamilla MD    Anesthesia: General    Staff:   Circulator: Marvin Bobby RN; Finn Pulido RN  Scrub Person: Shea Perales; Maris Lal; Sheri Blanchard         Estimated Blood Loss: minimal    Urine Voided: * No values recorded between 9/17/2020  9:53 AM and 9/17/2020 11:51 AM *    Specimens:                Specimens     ID Source Type Tests Collected By Collected At Frozen?      A Gastric, Fundus Tissue · TISSUE PATHOLOGY EXAM   Charlie Escamilla MD 9/17/20 1044 No     Description: Fundus of Stomach    This specimen was not marked as sent.            Findings: wnl    Complications: none          Charlie Escamilla MD     Date: 9/17/2020  Time: 11:51 CDT

## 2020-09-18 VITALS
RESPIRATION RATE: 16 BRPM | TEMPERATURE: 98.7 F | BODY MASS INDEX: 41.42 KG/M2 | HEART RATE: 74 BPM | HEIGHT: 66 IN | OXYGEN SATURATION: 98 % | SYSTOLIC BLOOD PRESSURE: 126 MMHG | WEIGHT: 257.72 LBS | DIASTOLIC BLOOD PRESSURE: 79 MMHG

## 2020-09-18 LAB
LAB AP CASE REPORT: NORMAL
PATH REPORT.FINAL DX SPEC: NORMAL
PATH REPORT.GROSS SPEC: NORMAL

## 2020-09-18 PROCEDURE — 25010000002 METOCLOPRAMIDE PER 10 MG: Performed by: SURGERY

## 2020-09-18 PROCEDURE — 25010000002 CEFAZOLIN PER 500 MG: Performed by: SURGERY

## 2020-09-18 PROCEDURE — 25010000002 KETOROLAC TROMETHAMINE PER 15 MG: Performed by: SURGERY

## 2020-09-18 PROCEDURE — 25010000002 ENOXAPARIN PER 10 MG: Performed by: SURGERY

## 2020-09-18 PROCEDURE — 25010000002 ONDANSETRON PER 1 MG: Performed by: SURGERY

## 2020-09-18 RX ORDER — TRAMADOL HYDROCHLORIDE 50 MG/1
50 TABLET ORAL EVERY 8 HOURS PRN
Qty: 30 TABLET | Refills: 0 | Status: SHIPPED | OUTPATIENT
Start: 2020-09-18 | End: 2020-09-18 | Stop reason: HOSPADM

## 2020-09-18 RX ORDER — SIMETHICONE 80 MG
40 TABLET,CHEWABLE ORAL EVERY 6 HOURS PRN
Qty: 30 TABLET | Refills: 1 | Status: SHIPPED | OUTPATIENT
Start: 2020-09-18 | End: 2021-06-23

## 2020-09-18 RX ORDER — ONDANSETRON 4 MG/1
4 TABLET, ORALLY DISINTEGRATING ORAL EVERY 8 HOURS PRN
Qty: 30 TABLET | Refills: 1 | Status: SHIPPED | OUTPATIENT
Start: 2020-09-18 | End: 2021-06-23

## 2020-09-18 RX ORDER — TRAMADOL HYDROCHLORIDE 50 MG/1
50 TABLET ORAL EVERY 8 HOURS PRN
Qty: 30 TABLET | Refills: 0 | Status: SHIPPED | OUTPATIENT
Start: 2020-09-18 | End: 2021-06-23

## 2020-09-18 RX ADMIN — ACETAMINOPHEN 150 MG: 160 SUSPENSION ORAL at 06:10

## 2020-09-18 RX ADMIN — CEFAZOLIN SODIUM 2 G: 10 INJECTION, POWDER, FOR SOLUTION INTRAVENOUS at 01:20

## 2020-09-18 RX ADMIN — ONDANSETRON HYDROCHLORIDE 4 MG: 2 SOLUTION INTRAMUSCULAR; INTRAVENOUS at 06:10

## 2020-09-18 RX ADMIN — ENOXAPARIN SODIUM 40 MG: 40 INJECTION SUBCUTANEOUS at 09:44

## 2020-09-18 RX ADMIN — METOCLOPRAMIDE 5 MG: 5 INJECTION, SOLUTION INTRAMUSCULAR; INTRAVENOUS at 09:44

## 2020-09-18 RX ADMIN — KETOROLAC TROMETHAMINE 30 MG: 30 INJECTION, SOLUTION INTRAMUSCULAR at 06:10

## 2020-09-18 RX ADMIN — METOCLOPRAMIDE 5 MG: 5 INJECTION, SOLUTION INTRAMUSCULAR; INTRAVENOUS at 03:50

## 2020-09-18 RX ADMIN — ACETAMINOPHEN 150 MG: 160 SUSPENSION ORAL at 03:49

## 2020-09-18 NOTE — DISCHARGE SUMMARY
Date of Discharge:  9/18/2020    Discharge Diagnosis: Class 3 severe obesity due to excess calories with serious comorbidity and body mass index (BMI) of 40.0 to 44.9 in adult (CMS/HCC) [E66.01, Z68.41]  Essential hypertension [I10]  Esophagitis [K20.9]  Class 3 severe obesity due to excess calories with serious comorbidity and body mass index (BMI) of 40.0 to 44.9 in adult (CMS/HCC) [E66.01, Z68.41]  Class 3 severe obesity due to excess calories with serious comorbidity and body mass index (BMI) of 40.0 to 44.9 in adult (CMS/HCC) [E66.01, Z68.41]    Presenting Problem/History of Present Illness  Class 3 severe obesity due to excess calories with serious comorbidity and body mass index (BMI) of 40.0 to 44.9 in adult (CMS/HCC) [E66.01, Z68.41]  Essential hypertension [I10]  Esophagitis [K20.9]  Class 3 severe obesity due to excess calories with serious comorbidity and body mass index (BMI) of 40.0 to 44.9 in adult (CMS/HCC) [E66.01, Z68.41]  Class 3 severe obesity due to excess calories with serious comorbidity and body mass index (BMI) of 40.0 to 44.9 in adult (CMS/HCC) [E66.01, Z68.41]       Hospital Course  Patient is a 25 y.o. female presented with morbid obesity as well as GERD and hypertension.  The patient is status post laparoscopic sleeve gastrectomy with da Gilbert robot.  Postoperatively the patient remained stable.  Her pain was adequately controlled.  She tolerated the advancement of her diet clears the following day.  She continued to ambulate and utilize her incentive spirometer.  Prior to discharge her discharge instructions and recommendations were provided to the patient and a handout for review.  Patient was then discharged home in a stable condition with instructions to follow-up accordingly.      Procedures Performed  Procedure(s):  GASTRIC SLEEVE LAPAROSCOPIC WITH DAVINCI ROBOT       Consults:   Consults     No orders found for last 30 day(s).            Condition on Discharge:  Stable    Vital  "Signs  /52 (BP Location: Right arm, Patient Position: Lying)   Pulse 56   Temp 98.4 °F (36.9 °C) (Oral)   Resp 16   Ht 167 cm (65.75\")   Wt 117 kg (257 lb 11.5 oz)   SpO2 96%   Breastfeeding No   BMI 41.92 kg/m²     Physical Exam:  General Appearance: alert, appears stated age and cooperative  Lungs: clear to auscultation, respirations regular, respirations even and respirations unlabored  Heart: regular rhythm & normal rate, normal S1, S2, no murmur, no gallop, no rub and no click  Abdomen: normal bowel sounds, no masses, no hepatomegaly, no splenomegaly, no guarding and no rebound tenderness, tender  RLQ  Extremities: moves extremities well, no edema, no cyanosis and no redness    Discharge Disposition  Home or Self Care    Discharge Medications     Discharge Medications      New Medications      Instructions Start Date   ondansetron ODT 4 MG disintegrating tablet  Commonly known as: Zofran ODT   4 mg, Translingual, Every 8 Hours PRN      simethicone 80 MG chewable tablet  Commonly known as: Gas-X   40 mg, Oral, Every 6 Hours PRN      traMADol 50 MG tablet  Commonly known as: Ultram   50 mg, Oral, Every 8 Hours PRN         Continue These Medications      Instructions Start Date   BUSPAR PO   Oral, 3 Times Daily      LISINOPRIL PO   Oral, Daily      omeprazole 20 MG capsule  Commonly known as: priLOSEC   20 mg, Oral, Daily         Stop These Medications    CALCIUM 600+D PO            Discharge Diet:     Activity at Discharge:     Follow-up Appointments  Future Appointments   Date Time Provider Department Center   9/24/2020  8:45 AM Charlie Escamilla MD [facility] None   10/19/2020 10:15 AM Charlie Escamilla MD [facility] None   12/21/2020  8:15 AM Charlie Escamilla MD [facility] None   3/16/2021  9:00 AM Donita Farfan APRN [facility] None   9/20/2021  9:00 AM Donita Farfan APRN [facility] None         Test Results Pending at Discharge  Pending Labs     Order Current Status    Tissue Pathology " Exam In process           Charlie Escamilla MD  09/18/20  07:26 CDT

## 2020-09-18 NOTE — PLAN OF CARE
Pt c/o pain. See MAR. Up to bathroom. IVF infusing. IV antibiotics as ordered. Tolerating ice chips. VSS. Will continue to monitor.

## 2020-09-18 NOTE — PLAN OF CARE
Goal Outcome Evaluation:  Plan of Care Reviewed With: patient  Progress: no change  Tolerated stage 1 diet. No reports of N/V. No c/o pain this AM.

## 2020-09-18 NOTE — PAYOR COMM NOTE
"Tierra Nevarez (25 y.o. Female) [id number]    D/C  Notification   D/C 9/18   [facility] phone    Fax        Date of Birth Social Security Number Address Home Phone MRN    1995  [address]  [place] [address] [phone] [id number]    Yarsanism Marital Status          Other Single       Admission Date Admission Type Admitting Provider Attending Provider Department, Room/Bed    9/17/20 Elective Charlie Escamilla MD  [facility] 3C, 371/1    Discharge Date Discharge Disposition Discharge Destination        9/18/2020 Home or Self Care              Attending Provider: (none)   Allergies: No Known Allergies    Isolation: None   Infection: None   Code Status: CPR    Ht: 167 cm (65.75\")   Wt: 117 kg (257 lb 11.5 oz)    Admission Cmt: None   Principal Problem: None                Active Insurance as of 9/17/2020     Primary Coverage     Payor Plan Insurance Group Employer/Plan Group    [id number]     Payor Plan Address Payor Plan Phone Number Payor Plan Fax Number Effective Dates    [address] [phone]  10/1/2015 - None Entered    [facility] [id number]       Subscriber Name Subscriber Birth Date Member ID       TIERRA NEVAREZ 1995 [id number]                 Emergency Contacts      (Rel.) Home Phone Work Phone Mobile Phone    SLIM VIDAL (Mother) [phone] -- --    CHRISTIANAPRADEEP (Friend) [phone] -- --               Operative/Procedure Notes (last 48 hours) (Notes from 09/16/20 1139 through 09/18/20 1139)      Charlie Escamilla MD at 09/17/20 0918          GASTRIC SLEEVE LAPAROSCOPIC WITH DAVINCI ROBOT  Progress Note    Tierra Nevarez  9/17/2020    Pre-op Diagnosis:   Class 3 severe obesity due to excess calories with serious comorbidity and body mass index (BMI) of 40.0 to 44.9 in adult (CMS/[facility]) [E66.01, Z68.41]  Essential hypertension [I10]  Esophagitis [K20.9]     "   Post-Op Diagnosis Codes:     * Class 3 severe obesity due to excess calories with serious comorbidity and body mass index (BMI) of 40.0 to 44.9 in adult (CMS/[facility]) [E66.01, Z68.41]     * Essential hypertension [I10]     * Esophagitis [K20.9]    Procedure/CPT® Codes:        Procedure(s):  GASTRIC SLEEVE LAPAROSCOPIC WITH DAVINCI ROBOT    Surgeon(s):  Charlie Escamilla MD    Anesthesia: General    Staff:   Circulator: Marvin Bobby RN; Finn Pulido RN  Scrub Person: Ida Perales Margaret; Sheri Blanchard         Estimated Blood Loss: minimal    Urine Voided: * No values recorded between 9/17/2020  9:53 AM and 9/17/2020 11:51 AM *    Specimens:                Specimens     ID Source Type Tests Collected By Collected At Frozen?      A Gastric, Fundus Tissue · TISSUE PATHOLOGY EXAM   Charlie Escamilla MD 9/17/20 1044 No     Description: Fundus of Stomach    This specimen was not marked as sent.            Findings: wnl    Complications: none          Charlie Escamilla MD     Date: 9/17/2020  Time: 11:51 CDT        Electronically signed by Charlie Escamilla MD at 09/17/20 1151     Charlie Escamilla MD at 09/17/20 0918            Robotic assisted laparoscopic sleeve Gastrectomy     Pre operative diagnosis: Body mass index is 41.92 kg/m²., associated comorbidities of  HTN    Post op diagnosis: as above      Indications: The patient was admitted to the hospital with history of morbid obesity with a Body mass index is 41.92 kg/m²..   she is scheduled for a Laparoscopic Sleeve Gastrectomy.       Surgeon: Charlie Escamilla MD     Assistants:  Circulator: Marvin Bobby RN; Finn Pulido RN  Scrub Person: Shea Perales; Maris Lal; Sheri Blanchard    Anesthesia: General endotracheal anesthesia    ASA Class: 3      Procedure Details         After the patient was explained the alternatives, benefits, complications, and risks of the robotic assisted laparoscopic sleeve  gastrectomy informed consent was provided.  The patient was brought to the OR suite after receiving preoperative antibiotics medications and anticoagulation.  The patient was placed under general anesthesia.  The patient was then prepped and draped in standard fashion.  We performed a surgical Timeout.  A 40 American bougie was placed into the oropharynx by anesthesia followed by placement to suction.  I then proceeded to locally anesthetize Left upper quadrant site for placement of a 8 mm incision which was followed by placement of a 8 mm trocar into the abdominal cavity with camera assist.  I insufflated the abdominal cavity to a pressure of 15 mmHg.  An 8 mm incision was made in the periumbilical midline location for placement of an 8 mm trocar under direct visualization, location approximately 28 cm from the sternal notch.  An 8 mm trocar was placed on the patient's right upper quadrant lateral to the periumbilical trocar under direct visualization as well as a 12 mm trocar lateral to this site along the same plane under direct visualization.  An additional 8 mm trocar was placed in the left upper quadrant under direct visualization in similar fashion.   A 5 mm incision was placed in the subxiphoid location for placement of a 5 mm trocar under direct visualization.  I removed this trocar and replaced it with a Austin liver retractor.  Once adequate exposure of the Hiatus and stomach was obtained, I then docked the robot to the trochars.  My robotic instruments were then placed under direct visualization into the surgical field.  I then went to the robotic console and began the procedure.  First I proceeded with dissecting the fat pad near the angle of Hiss away from the diaphragm.  I then proceeded with ligating the greater curvature vessels starting mid greater curvature working my way distally and proximally where my most distal ligation site was 6 cm from the pylorus.  I continued proximally along the  greater curvature ligating the vessels as well as a short gastrics.  This was all accomplished using the vessel sealer.  Once completion of ligation of the vessels was obtained. I dissected away attachments found posteriorly to the stomach.  Care was made to assure no injury to the pancreas. Completion of my dissection was obtained once visualization of the posterior left crural muscle was seen.  The sleeve gastrectomy was then created. I fired my most distal load first after the bougie was retracted proximally.  I then reinserted the bougie to fit into that small portion of the stomach near the prepyloric location/antrum.  Then completed the firing of the stapler device to create the sleeve gastrectomy hugging the bougie.  My most distal staple line was approximately 6 cm from the pylorus and I continued proximally along the greater curvature and assuring that the width from the angularis incisura was at least 3 cm.  My final proximal stapler was at least 1-2 cm from the GE junction.  I then assessed the staple line to observe for hemostasis.  Once hemostasis was confirmed I then proceeded with my leak test.  Formed utilizing air which was insufflated into the sleeve by the bougie and submerging the staple line under saline.  I observed for any evidence of bubbles or leakage.  There was no evidence of leakage or bubbles noted and I then proceeded with suctioning out the air from the sleeve.  Once there was no evidence of leakage I then requested that the bougie be placed off suction, given a small puff and removed under direct visualization.    I placed Tisseel on the staple line under direct visualization.    I then pexied the omentum to the proximal portion of the sleeve with 2-0 absorbable suture.  I reassess for hemostasis and then proceeded to scrub back into the case for removal of the resected portion of the stomach after the robotic instruments were removed under direct visualization.  The resected portion  of the stomach was removed from the 12 mm trocar site under direct visualization.    The 12 mm trocar site's fascia was reapproximated using a Mathieu Rankin device and an 0 Vicryl suture.   The liver retractor was removed under direct visualization as well as the pneumoperitoneum and remaining trochars.  Then re-locally anesthetized skin wounds for closure.  Skin wounds were closed with 4-0 Monocryl.  Prior to closing skin wounds all lap pads and attachments were accounted for at the end of the procedure.    Blue staples: 4  White staples:1      Findings: wnl    Estimated Blood Loss:  minimal                    Total IV Fluids: 800 ml           Specimens:   Specimens     ID Source Type Tests Collected By Collected At Frozen?      A Gastric, Fundus Tissue · TISSUE PATHOLOGY EXAM   Charlie Escamilla MD 9/17/20 1044 No     Description: Fundus of Stomach    This specimen was not marked as sent.                 Implants:   Implant Name Type Inv. Item Serial No.  Lot No. LRB No. Used Action   RELOAD STPLR SUREFORM 60 DAVINCI/X/XI 6ROW 3.5 JAKI 1P/U - [id number] Implant RELOAD STPLR SUREFORM 60 DAVINCI/X/XI 6ROW 3.5 JAKI 1P/U  INTUITIVE [employer] [id number] N/A 5 Implanted   SEAL FIBRIN TISSEEL FZ 4ML - [id number] Implant SEAL FIBRIN TISSEEL FZ 4ML  GoGuide [id number] N/A 1 Implanted   RELOAD STPLR SUREFORM 60 DAVINCI/X/XI 6ROW 2.5 WHT 1P/U - [id number] Implant RELOAD STPLR SUREFORM 60 DAVINCI/X/XI 6ROW 2.5 WHT 1P/U  INTUITIVE SURGICAL [id number] N/A 1 Implanted              Complications:  none           Disposition: PACU - hemodynamically stable.           Condition: stable    Electronically signed by Charlie Escamilla MD at 09/17/20 1153       Discharge Order (From admission, onward)     Start     Ordered    09/18/20 0725  Discharge patient  Once     Expected Discharge Date: 09/18/20    Discharge Disposition: Home or Self Care    Physician of Record for Attribution - Please select from Treatment Team:  KARLI XAVIER [[id number]]    Review needed by CMO to determine Physician of Record: No       Question Answer Comment   Physician of Record for Attribution - Please select from Treatment Team KARLI XAVIER    Review needed by CMO to determine Physician of Record No        09/18/20 0725

## 2020-09-21 ENCOUNTER — TELEMEDICINE (OUTPATIENT)
Dept: BARIATRICS/WEIGHT MGMT | Facility: CLINIC | Age: 25
End: 2020-09-21

## 2020-09-21 DIAGNOSIS — B37.0 ORAL YEAST INFECTION: Primary | ICD-10-CM

## 2020-09-21 PROCEDURE — 99024 POSTOP FOLLOW-UP VISIT: CPT | Performed by: SURGERY

## 2020-09-21 RX ORDER — BENZOCAINE/MENTHOL 6 MG-10 MG
1 LOZENGE MUCOUS MEMBRANE EVERY 4 HOURS PRN
Qty: 20 LOZENGE | Refills: 0 | Status: SHIPPED | OUTPATIENT
Start: 2020-09-21 | End: 2021-06-23

## 2020-09-21 NOTE — PROGRESS NOTES
The patient called September 24, 2020 with complaints of sore throat and white spots on the back of her throat.  She denies fevers or chills.  I requested a video visit to assess the patient.  Upon looking at the oropharynx I saw patches of white spots consistent with oral thrush and I instructed the patient that I would be calling in medication as well as a throat lozenge or for her to take as directed.  She is to follow-up with us if her symptoms worsen, if she has fevers or chills or nausea and vomiting.

## 2020-09-22 ENCOUNTER — TELEPHONE (OUTPATIENT)
Dept: BARIATRICS/WEIGHT MGMT | Facility: CLINIC | Age: 25
End: 2020-09-22

## 2020-09-22 DIAGNOSIS — B37.0 ORAL YEAST INFECTION: Primary | ICD-10-CM

## 2020-09-22 NOTE — TELEPHONE ENCOUNTER
"    Called the patient September 22, 2020 at 1:14 PM for follow-up.  The patient states that after starting the medication she does feel better.  Her throat is still sore but not as sore as it was prior to starting the medication.  She does admit to not being able to get the prescription lozenges however she was able to obtain over-the-counter's \"spray\".  For her sore throat.  "

## 2020-09-24 ENCOUNTER — OFFICE VISIT (OUTPATIENT)
Dept: BARIATRICS/WEIGHT MGMT | Facility: CLINIC | Age: 25
End: 2020-09-24

## 2020-09-24 VITALS
BODY MASS INDEX: 41.48 KG/M2 | WEIGHT: 249 LBS | TEMPERATURE: 95 F | SYSTOLIC BLOOD PRESSURE: 97 MMHG | DIASTOLIC BLOOD PRESSURE: 61 MMHG | HEART RATE: 109 BPM | OXYGEN SATURATION: 95 % | HEIGHT: 65 IN

## 2020-09-24 DIAGNOSIS — E66.01 CLASS 3 SEVERE OBESITY DUE TO EXCESS CALORIES WITH SERIOUS COMORBIDITY AND BODY MASS INDEX (BMI) OF 40.0 TO 44.9 IN ADULT (HCC): ICD-10-CM

## 2020-09-24 DIAGNOSIS — Z98.84 STATUS POST LAPAROSCOPIC SLEEVE GASTRECTOMY: Primary | ICD-10-CM

## 2020-09-24 DIAGNOSIS — B37.0 ORAL YEAST INFECTION: ICD-10-CM

## 2020-09-24 PROCEDURE — 99024 POSTOP FOLLOW-UP VISIT: CPT | Performed by: SURGERY

## 2020-09-24 NOTE — PROGRESS NOTES
"  Patient Care Team:  Umang Fuentes APRN as PCP - General (Nurse Practitioner)    Subjective   Katrin Friedman is a 25 y.o. female.     Katrin is post op 1 month from her sleeve gastrectomy.  Patient developed a yeast infection in her mouth and has been started on nystatin.  She stated she stopped the nystatin because it caused her to have diarrhea after she swallowed it.  She is currently on her stage II diet.  She states that her throat has improved somewhat and she is able to consume at least 64 ounces of fluid and drinks at least 1 protein shake a day.    Review Of Systems:  General ROS: negative  ENT ROS: positive for - sore throat  Respiratory ROS: no cough, shortness of breath, or wheezing  Cardiovascular ROS: no chest pain or dyspnea on exertion  Gastrointestinal ROS: no abdominal pain, change in bowel habits, or black or bloody stools    The following portions of the patient's history were reviewed and updated as appropriate: allergies, current medications, past medical history, past social history, past surgical history and problem list.    Objective   BP 97/61 (BP Location: Left arm, Patient Position: Sitting, Cuff Size: Adult)   Pulse 109   Temp 95 °F (35 °C)   Ht 165.7 cm (65.25\")   Wt 113 kg (249 lb)   SpO2 95%   BMI 41.12 kg/m²       09/24/20  0833   Weight: 113 kg (249 lb)        General Appearance:  awake, alert, oriented, in no acute distress  Mouth/Throat:  Throat- erythema-moderate and white patches- present  Lungs:  Normal expansion.  Clear to auscultation.  No rales, rhonchi, or wheezing.  Heart:  Heart sounds are normal.  Regular rate and rhythm without murmur, gallop or rub.  Abdomen:  Soft, non-tender, normal bowel sounds; no bruits, organomegaly or masses.  Abnormal shape: obese  Wounds: clean, dry, intact    Assessment/Plan     Encounter Diagnoses   Name Primary?   • Status post laparoscopic sleeve gastrectomy Yes   • Class 3 severe obesity due to excess calories with serious " comorbidity and body mass index (BMI) of 40.0 to 44.9 in adult (CMS/[facility])    • Oral yeast infection        Katrin Friedman and I discussed the importance of changing behavior for consistent and successful weight loss.  We first reviewed again the definition of a meal which is defined as portion sizes less than a half a cup and those portions incorporating a protein.  Specifically the protein should fill half of that volume.  I also explained that she should attempt to consume 4 meals as defined above daily and to avoid snacking or grazing.  She should also be mindful of adequate hydration by consuming at least 64 oz of water daily and incorporation of a regular exercise regimen.   I discussed the importance of taking her multivitamins as directed.  With regards to her thrush I have recommended that she does not swallow but just swishes with the nystatin to avoid symptoms of diarrhea.  She is to complete this as directed.  If she continues to have symptoms of diarrhea then she is instructed to call our office for alternative options.  She is to return to our office 2 months or as needed.    09/24/20  09:02 CDT  Patient Care Team:  Umang Fuentes APRN as PCP - General (Nurse Practitioner)  Charlie Escamilla MD FACS

## 2020-10-16 ENCOUNTER — TELEPHONE (OUTPATIENT)
Dept: BARIATRICS/WEIGHT MGMT | Facility: CLINIC | Age: 25
End: 2020-10-16

## 2020-10-19 ENCOUNTER — TELEPHONE (OUTPATIENT)
Dept: BARIATRICS/WEIGHT MGMT | Facility: CLINIC | Age: 25
End: 2020-10-19

## 2021-01-13 ENCOUNTER — HOSPITAL ENCOUNTER (EMERGENCY)
Facility: HOSPITAL | Age: 26
Discharge: LEFT BEFORE DISCHARGE | End: 2021-01-13

## 2021-01-13 ENCOUNTER — NURSE TRIAGE (OUTPATIENT)
Dept: CALL CENTER | Facility: HOSPITAL | Age: 26
End: 2021-01-13

## 2021-01-13 VITALS
TEMPERATURE: 98.4 F | OXYGEN SATURATION: 100 % | HEART RATE: 57 BPM | SYSTOLIC BLOOD PRESSURE: 127 MMHG | WEIGHT: 224 LBS | DIASTOLIC BLOOD PRESSURE: 72 MMHG | RESPIRATION RATE: 16 BRPM | HEIGHT: 66 IN | BODY MASS INDEX: 36 KG/M2

## 2021-01-13 PROCEDURE — 99211 OFF/OP EST MAY X REQ PHY/QHP: CPT

## 2021-01-13 NOTE — TELEPHONE ENCOUNTER
"I have been having abd pain 3-4 days right side up under breast, worse when I eat, I went [place] [facility] [place] told had [place] and gave pain meds and was sent home, but pain is getting worse, pain pills ar not helping much, I am nauseated, not vomiting. What should I do. Told needs to be seen in 24 hours by triage nurse.     Reason for Disposition  • [1] MODERATE pain (e.g., interferes with normal activities) AND [2] pain comes and goes (cramps) AND [3] present > 24 hours  (Exception: pain with Vomiting or Diarrhea - see that Guideline)    Additional Information  • Negative: Shock suspected (e.g., cold/pale/clammy skin, too weak to stand, low BP, rapid pulse)  • Negative: Difficult to awaken or acting confused (e.g., disoriented, slurred speech)  • Negative: Passed out (i.e., lost consciousness, collapsed and was not responding)  • Negative: Sounds like a life-threatening emergency to the triager  • Negative: Chest pain  • Negative: Pain is mainly in upper abdomen  (if needed ask: \"is it mainly above the belly button?\")  • Negative: Followed an abdomen (stomach) injury  • Negative: [1] Abdominal pain AND [2] pregnant < 20 weeks  • Negative: [1] Abdominal pain AND [2] pregnant > 20 weeks  • Negative: [1] Abdominal pain AND [2] postpartum (from 0 to 6 weeks after delivery)  • Negative: [1] SEVERE pain (e.g., excruciating) AND [2] present > 1 hour  • Negative: [1] SEVERE pain AND [2] age > 60  • Negative: [1] Vomiting AND [2] contains red blood or black (\"coffee ground\") material  (Exception: few red streaks in vomit that only happened once)  • Negative: Blood in bowel movements   (Exception: blood on surface of BM with constipation)  • Negative: Black or tarry bowel movements  (Exception: chronic-unchanged  black-grey bowel movements AND is taking iron pills or Pepto-bismol)  • Negative: Patient sounds very sick or weak to the triager  • Negative: [1] MILD-MODERATE pain AND [2] constant AND [3] present > 2 hours  • " "Negative: [1] Vomiting AND [2] abdomen looks much more swollen than usual  • Negative: [1] Vomiting AND [2] contains bile (green color)  • Negative: White of the eyes have turned yellow (i.e., jaundice)  • Negative: Fever > 103 F (39.4 C)  • Negative: [1] Fever > 101 F (38.3 C) AND [2] age > 60  • Negative: [1] Fever > 100.0 F (37.8 C) AND [2] bedridden (e.g., nursing home patient, CVA, chronic illness, recovering from surgery)  • Negative: [1] Fever > 100.0 F (37.8 C) AND [2] diabetes mellitus or weak immune system (e.g., HIV positive, cancer chemo, splenectomy, organ transplant, chronic steroids)  • Negative: [1] SEVERE pain AND [2] present < 1 hour  • Negative: [1] MILD pain (e.g., does not interfere with normal activities) AND [2] pain comes and goes (cramps) AND [3] present > 48 hours  • Negative: Age > 60 years  • Negative: Pregnancy suspected (e.g., missed last menstrual period)  • Negative: Unusual vaginal discharge (e.g., bad smelling, yellow, green, or foamy-white)  • Negative: Blood in urine (red, pink, or tea-colored)    Answer Assessment - Initial Assessment Questions  1. LOCATION: \"Where does it hurt?\"       Right under right breast  2. RADIATION: \"Does the pain shoot anywhere else?\" (e.g., chest, back)      No radiation   3. ONSET: \"When did the pain begin?\" (e.g., minutes, hours or days ago)       Started 3 days ago  4. SUDDEN: \"Gradual or sudden onset?\"      Gradually got worse  5. PATTERN \"Does the pain come and go, or is it constant?\"     - If constant: \"Is it getting better, staying the same, or worsening?\"       (Note: Constant means the pain never goes away completely; most serious pain is constant and it progresses)      - If intermittent: \"How long does it last?\" \"Do you have pain now?\"      (Note: Intermittent means the pain goes away completely between bouts)      Constant dull pain worse when I eat  6. SEVERITY: \"How bad is the pain?\"  (e.g., Scale 1-10; mild, moderate, or severe)    - " "MILD (1-3): doesn't interfere with normal activities, abdomen soft and not tender to touch     - MODERATE (4-7): interferes with normal activities or awakens from sleep, tender to touch     - SEVERE (8-10): excruciating pain, doubled over, unable to do any normal activities       4 best 6 worst  7. RECURRENT SYMPTOM: \"Have you ever had this type of abdominal pain before?\" If so, ask: \"When was the last time?\" and \"What happened that time?\"       no  8. CAUSE: \"What do you think is causing the abdominal pain?\"      Gallbladder maybe  9. RELIEVING/AGGRAVATING FACTORS: \"What makes it better or worse?\" (e.g., movement, antacids, bowel movement)      Eating makes it worse, nothing makes it better  10. OTHER SYMPTOMS: \"Has there been any vomiting, diarrhea, constipation, or urine problems?\"        constipation  11. PREGNANCY: \"Is there any chance you are pregnant?\" \"When was your last menstrual period?\"        No had hyst. 2018    Protocols used: ABDOMINAL PAIN - FEMALE-ADULT-AH      "

## 2021-01-17 ENCOUNTER — NURSE TRIAGE (OUTPATIENT)
Dept: CALL CENTER | Facility: HOSPITAL | Age: 26
End: 2021-01-17

## 2021-01-17 NOTE — TELEPHONE ENCOUNTER
"Caller states abdominal area tight and abdominal pain for day's now in RUQ. Caller can't remember her last BM and states she has tried everything from enemas to that \"liquid stuff\" she states. Caller denies rectal pain or bleeding. Caller reporting nausea. Advised per guideline.      Reason for Disposition  • [1] Vomiting AND [2] abdomen looks much more swollen than usual    Additional Information  • Negative: [1] Abdomen pain is main symptom AND [2] male  • Negative: [1] Abdomen pain is main symptom AND [2] adult female  • Negative: Rectal bleeding or blood in stool is main symptom  • Negative: Rectal pain or itching is main symptom  • Negative: Constipation in a cancer patient who is currently (or recently) receiving chemotherapy or radiation therapy, or cancer patient who has metastatic or end-stage cancer and is receiving palliative care  • Negative: Patient sounds very sick or weak to the triager    Answer Assessment - Initial Assessment Questions  1. STOOL PATTERN OR FREQUENCY: \"How often do you pass bowel movements (BMs)?\"  (Normal range: tid to q 3 days)  \"When was the last BM passed?\"        Once every four or five days   2. STRAINING: \"Do you have to strain to have a BM?\"       Yes   3. RECTAL PAIN: \"Does your rectum hurt when the stool comes out?\" If so, ask: \"Do you have hemorrhoids? How bad is the pain?\"  (Scale 1-10; or mild, moderate, severe)      No pain some edema  4. STOOL COMPOSITION: \"Are the stools hard?\"       Normal   5. BLOOD ON STOOLS: \"Has there been any blood on the toilet tissue or on the surface of the BM?\" If so, ask: \"When was the last time?\"       Denies   6. CHRONIC CONSTIPATION: \"Is this a new problem for you?\"  If no, ask: \"How long have you had this problem?\" (days, weeks, months)       Yes   7. CHANGES IN DIET: \"Have there been any recent changes in your diet?\"       Weight loss surgery in Sept 8. MEDICATIONS: \"Have you been taking any new medications?\"      Yes new medication " "  9. LAXATIVES: \"Have you been using any laxatives or enemas?\"  If yes, ask \"What, how often, and when was the last time?\"       Enema, laxative and \"liquid stuff\".   10. CAUSE: \"What do you think is causing the constipation?\"         Some worse   11. OTHER SYMPTOMS: \"Do you have any other symptoms?\" (e.g., abdominal pain, fever, vomiting)        Upper abdominal pain and nausea   12. PREGNANCY: \"Is there any chance you are pregnant?\" \"When was your last menstrual period?\"        Denies    Protocols used: CONSTIPATION-ADULT-      "

## 2021-01-18 ENCOUNTER — TELEPHONE (OUTPATIENT)
Dept: BARIATRICS/WEIGHT MGMT | Facility: CLINIC | Age: 26
End: 2021-01-18

## 2021-06-22 ENCOUNTER — TELEPHONE (OUTPATIENT)
Dept: BARIATRICS/WEIGHT MGMT | Facility: CLINIC | Age: 26
End: 2021-06-22

## 2021-06-23 ENCOUNTER — OFFICE VISIT (OUTPATIENT)
Dept: BARIATRICS/WEIGHT MGMT | Facility: CLINIC | Age: 26
End: 2021-06-23

## 2021-06-23 VITALS
HEART RATE: 82 BPM | BODY MASS INDEX: 30.73 KG/M2 | TEMPERATURE: 98.9 F | HEIGHT: 66 IN | WEIGHT: 191.2 LBS | OXYGEN SATURATION: 97 % | SYSTOLIC BLOOD PRESSURE: 116 MMHG | DIASTOLIC BLOOD PRESSURE: 75 MMHG

## 2021-06-23 DIAGNOSIS — I10 ESSENTIAL HYPERTENSION: ICD-10-CM

## 2021-06-23 DIAGNOSIS — Z98.84 STATUS POST LAPAROSCOPIC SLEEVE GASTRECTOMY: Primary | ICD-10-CM

## 2021-06-23 DIAGNOSIS — E66.09 CLASS 1 OBESITY DUE TO EXCESS CALORIES WITH SERIOUS COMORBIDITY AND BODY MASS INDEX (BMI) OF 31.0 TO 31.9 IN ADULT: ICD-10-CM

## 2021-06-23 PROBLEM — E66.811 CLASS 1 OBESITY DUE TO EXCESS CALORIES WITH SERIOUS COMORBIDITY AND BODY MASS INDEX (BMI) OF 31.0 TO 31.9 IN ADULT: Status: ACTIVE | Noted: 2020-02-13

## 2021-06-23 PROCEDURE — 99213 OFFICE O/P EST LOW 20 MIN: CPT | Performed by: NURSE PRACTITIONER

## 2021-06-23 RX ORDER — MULTIPLE VITAMINS W/ MINERALS TAB 9MG-400MCG
1 TAB ORAL DAILY
COMMUNITY
End: 2023-02-08

## 2021-06-23 RX ORDER — BIOTIN 10 MG
TABLET ORAL
COMMUNITY
End: 2023-02-08

## 2021-06-23 NOTE — PROGRESS NOTES
"Patient Care Team:  Umang Fuentes APRN as PCP - General (Nurse Practitioner)    Chief Complaint: S/P 9 months    Subjective     Katrin Wong is POD 9 months from a sleeve gastrectomy.  Patient is here today for her routine follow-up.  Patient was last present at her 1 week follow-up.  Patient states overall she is doing well.  Patient's starting weight was 269 pounds which is a BMI of 44.42.  Patient's weight today is 191.2 pounds with a BMI of 33.33.  Patient states that she is struggling with skin irritation below her loose skin.  She states that she is having some hair loss and is started taking biotin.  Patient states that her weight has plateaued the past 3 months.  Patient has began smoking again and states that she is smoking 1 to 3 cigarettes/day.  Patient denies soda intake at this time.  Patient is eating 4 meals per day 1 cup total.     Review of Systems:     Review of Systems   Constitutional: Negative.    Respiratory: Negative.    Cardiovascular: Negative.    Gastrointestinal: Negative.    Endocrine: Negative.         Hair loss   Musculoskeletal: Negative.    Psychiatric/Behavioral: Positive for sleep disturbance.       Objective     Vital Signs  /75 (BP Location: Right arm, Patient Position: Sitting, Cuff Size: Adult)   Pulse 82   Temp 98.9 °F (37.2 °C)   Ht 166.4 cm (65.5\")   Wt 86.7 kg (191 lb 3.2 oz)   SpO2 97%   BMI 31.33 kg/m²     Physical Exam:    Physical Exam  Vitals reviewed.   Constitutional:       Appearance: She is obese.   Cardiovascular:      Rate and Rhythm: Normal rate and regular rhythm.   Pulmonary:      Effort: Pulmonary effort is normal.   Abdominal:      General: Bowel sounds are normal.      Palpations: Abdomen is soft.   Musculoskeletal:         General: Normal range of motion.   Skin:     General: Skin is warm and dry.   Neurological:      Mental Status: She is alert and oriented to person, place, and time.   Psychiatric:         Mood and Affect: Mood " normal.         Behavior: Behavior normal.          Results Review:    Labs reviewed    Medication Reviewed:   Current Outpatient Medications   Medication Sig Dispense Refill   • Biotin 10 MG tablet Take  by mouth.     • multivitamin with minerals (MULTIVITAMIN ADULT PO) Take 1 tablet by mouth Daily.       No current facility-administered medications for this visit.       Assessment/Plan  POD 9 months from a sleeve gastrectomy.        Katrin Wong and I discussed the importance of changing behavior for consistent and successful weight loss.  We first reviewed again the definition of a meal which is defined as portion sizes less than a cup and those portions incorporating a protein.  Specifically the protein should fill half of that volume.  I also explained that she should attempt to consume 4 meals as defined above daily and to avoid snacking or grazing.  She should also be mindful of adequate hydration by consuming at least 64 oz of water daily and incorporation of a regular exercise regimen.   I discussed the importance of taking her multivitamins as directed.    Patient is having some side effects from her loose skin.  I have encouraged her to speak with plastic surgery about having skin removal.  Patient has a frequent yeast infection underneath her lower abdominal skin fold.  Patient has been advised to continue utilizing her cream and will follow up with a plastic surgeon.  Patient has been advised to continue 1 cup total of food and not to exceed a cup.  Patient has been advised to increase her water intake to a minimum of 64 ounces each day.  Patient verbalizes understanding and will return to her office in 6 months.    STEFANIE Francois  06/23/21  14:03 CDT

## 2021-07-08 ENCOUNTER — TELEPHONE (OUTPATIENT)
Dept: BARIATRICS/WEIGHT MGMT | Facility: CLINIC | Age: 26
End: 2021-07-08

## 2021-07-08 NOTE — PROGRESS NOTES
"Nutrition Services    Patient Name:  Katrin Wong  YOB: 1995  MRN: [id number]  Admit Date:  (Not on file)    Spoke with pt earlier in the week regarding a product called \"Organic Greens\"  The product has < 1 gm of protein and only 1 gm of fiber per serving, and pt was wanting to know if she could use it as a vegetable.  Discussed with [employer] and we concluded that the pt could use it, but most likely would not get any benefit from it since it is so low in fiber.  Encouraged pt to notify the office in the event of further questions/struggles.    Electronically signed by:  Annmarie Multani  07/08/21 11:02 CDT   "

## 2022-09-21 ENCOUNTER — TELEPHONE (OUTPATIENT)
Dept: BARIATRICS/WEIGHT MGMT | Facility: CLINIC | Age: 27
End: 2022-09-21

## 2022-09-21 NOTE — TELEPHONE ENCOUNTER
LVM for the patient to return my call to reschedule a missed/cancelled appt. I also mailed a letter regarding this message.

## 2023-02-08 ENCOUNTER — OFFICE VISIT (OUTPATIENT)
Dept: CARDIOLOGY | Facility: CLINIC | Age: 28
End: 2023-02-08
Payer: COMMERCIAL

## 2023-02-08 VITALS
BODY MASS INDEX: 33.1 KG/M2 | HEART RATE: 71 BPM | WEIGHT: 202 LBS | DIASTOLIC BLOOD PRESSURE: 66 MMHG | OXYGEN SATURATION: 98 % | SYSTOLIC BLOOD PRESSURE: 124 MMHG

## 2023-02-08 DIAGNOSIS — K21.9 CHEST PAIN DUE TO GERD: ICD-10-CM

## 2023-02-08 DIAGNOSIS — R06.09 DYSPNEA ON EXERTION: Primary | ICD-10-CM

## 2023-02-08 DIAGNOSIS — R00.2 PALPITATIONS: ICD-10-CM

## 2023-02-08 DIAGNOSIS — Z82.49 FAMILY HISTORY OF PREMATURE CAD: ICD-10-CM

## 2023-02-08 DIAGNOSIS — R53.83 FATIGUE, UNSPECIFIED TYPE: ICD-10-CM

## 2023-02-08 DIAGNOSIS — R07.9 CHEST PAIN DUE TO GERD: ICD-10-CM

## 2023-02-08 PROCEDURE — 99204 OFFICE O/P NEW MOD 45 MIN: CPT | Performed by: HOSPITALIST

## 2023-02-08 PROCEDURE — 93000 ELECTROCARDIOGRAM COMPLETE: CPT | Performed by: HOSPITALIST

## 2023-02-08 RX ORDER — AMOXICILLIN AND CLAVULANATE POTASSIUM 875; 125 MG/1; MG/1
TABLET, FILM COATED ORAL
COMMUNITY
Start: 2023-01-24 | End: 2023-02-23

## 2023-02-08 NOTE — PROGRESS NOTES
Reason For Visit:  Dyspnea and Fatigue    Subjective        Katrin Wong is a 27 y.o. female with a PMH of anxiety and prior bariatric surgery who presents as a self-referral for dyspnea and fatigue.    Patient had the flu a couple of weeks ago and reports that since that time she has had persistent shortness of breath with activity and extreme fatigue.  She also has been experiencing palpitations occasionally for a more prolonged period of time that she states feels like a fluttering in her chest that takes her breath away occurring every week or 2 for short period of time.  She was seen in an ED over the weekend for pleuritic chest pain in addition to her shortness of breath and fatigue.  Work-up there was relatively unremarkable including 2 normal high-sensitivity troponins, relatively unremarkable CBC (mild anemia w/ Hgb 11.9)  and CMP (Cr 0.51), CXR without acute process, and reportedly unremarkable ECG.  She was told to see cardiologist for further evaluation.    Today, she reports that she does not have any exertional chest pain but does occasionally get burning in her chest now or so after she eats.  Her biggest issue is severe shortness of breath and fatigue.  She denies orthopnea (states that she has never been able to lie flat) and has not had any lower extremity edema.    ROS: Pertinent positives and negatives are included above.    Pertinent past medical, surgical, family, and social history were reviewed and updated in the EMR.      Current Outpatient Medications:   •  amoxicillin-clavulanate (AUGMENTIN) 875-125 MG per tablet, , Disp: , Rfl:   •  Biotin 10 MG tablet, Take  by mouth., Disp: , Rfl:   •  multivitamin with minerals tablet tablet, Take 1 tablet by mouth Daily., Disp: , Rfl:      Objective   Vital Signs:  /66   Pulse 71   Wt 91.6 kg (202 lb)   SpO2 98%   BMI 33.10 kg/m²   Estimated body mass index is 33.1 kg/m² as calculated from the following:    Height as of 6/23/21: 166.4  "cm (65.5\").    Weight as of this encounter: 91.6 kg (202 lb).      Constitutional:       Appearance: Healthy appearance. Not in distress.   Neck:      Vascular: JVD normal.   Pulmonary:      Effort: Pulmonary effort is normal.      Breath sounds: Normal breath sounds.   Cardiovascular:      Normal rate. Regular rhythm. S2. Loud P2       Murmurs: There is no murmur.      No gallop. No click. No rub.   Edema:     Peripheral edema absent.   Abdominal:      General: There is no distension.      Palpations: Abdomen is soft.      Tenderness: There is no abdominal tenderness.   Skin:     General: Skin is warm and dry.   Neurological:      Mental Status: Alert and oriented to person, place and time.        Result Review :  The following data was reviewed by: Osmel Dunbar MD on 02/08/2023:    Data reviewed: GI studies Recent ED visit note and results summarized above.     ECG 12 Lead    Date/Time: 2/8/2023 4:17 PM  Performed by: Osmel Dunbar MD  Authorized by: Osmel Dunbar MD   Comparison: compared with previous ECG from 6/26/2020  Similar to previous ECG  Rhythm: sinus rhythm  Conduction: incomplete right bundle branch block  Clinical impression comment: Borderline ECG                Assessment and Plan   Diagnoses and all orders for this visit:    1. Dyspnea on exertion (Primary)  -     TSH; Future  -     BNP; Future  -     Adult Transthoracic Echo Complete w/ Color, Spectral and Contrast if necessary per protocol; Future  -     ECG 12 Lead    2. Fatigue, unspecified type  -     TSH; Future  -     Basic Metabolic Panel; Future  -     BNP; Future  -     Adult Transthoracic Echo Complete w/ Color, Spectral and Contrast if necessary per protocol; Future    3. Family history of premature CAD  Comments:  Father had MI's starting in 40's  Orders:  -     Lipid Panel; Future  -     Lipoprotein A (LPA); Future    4. Palpitations  -     Adult Transthoracic Echo Complete w/ Color, Spectral and Contrast if necessary per " protocol; Future  -     Holter Monitor - 72 Hour Up To 15 Days; Future    5. Chest pain due to GERD: Chest pain features not consistent with cardiac chest pain (also recent normal ED workup); more consistent with GERD given postprandial component. Recommended PCP follow-up.      Concern for CHF; could consider viral myocarditis given recent flu although normal cardiac enzymes in ED recently is reassuring. She does have a loud P2 on exam, so pulmonary hypertension also a consideration. Will evaluate with echo and cardiac monitor (given palpitations) as well as labs above.    Follow Up   Return in about 15 days (around 2/23/2023).  Patient was given instructions and counseling regarding her condition or for health maintenance advice. Please see specific information pulled into the AVS if appropriate.       EMR Dragon/Transcription disclaimer: Much of this encounter note is an electronic transcription/translation of spoken language to printed text. The electronic translation of spoken language may permit erroneous, or at times, nonsensical words or phrases to be inadvertently transcribed; although I have reviewed the note for such errors, some may still exist.

## 2023-02-17 ENCOUNTER — HOSPITAL ENCOUNTER (OUTPATIENT)
Dept: CARDIOLOGY | Facility: HOSPITAL | Age: 28
Discharge: HOME | End: 2023-02-17
Admitting: HOSPITALIST
Payer: COMMERCIAL

## 2023-02-17 VITALS
SYSTOLIC BLOOD PRESSURE: 124 MMHG | WEIGHT: 202 LBS | HEIGHT: 65 IN | DIASTOLIC BLOOD PRESSURE: 66 MMHG | BODY MASS INDEX: 33.66 KG/M2

## 2023-02-17 DIAGNOSIS — R06.09 DYSPNEA ON EXERTION: ICD-10-CM

## 2023-02-17 DIAGNOSIS — R00.2 PALPITATIONS: ICD-10-CM

## 2023-02-17 DIAGNOSIS — R53.83 FATIGUE, UNSPECIFIED TYPE: ICD-10-CM

## 2023-02-17 PROCEDURE — 93306 TTE W/DOPPLER COMPLETE: CPT

## 2023-02-17 PROCEDURE — 93306 TTE W/DOPPLER COMPLETE: CPT | Performed by: HOSPITALIST

## 2023-02-20 LAB
BH CV ECHO MEAS - AO MAX PG: 10.8 MMHG
BH CV ECHO MEAS - AO MEAN PG: 5 MMHG
BH CV ECHO MEAS - AO ROOT DIAM: 2.8 CM
BH CV ECHO MEAS - AO V2 MAX: 164 CM/SEC
BH CV ECHO MEAS - AO V2 VTI: 34.4 CM
BH CV ECHO MEAS - AVA(I,D): 3.8 CM2
BH CV ECHO MEAS - EDV(CUBED): 132.7 ML
BH CV ECHO MEAS - EDV(MOD-SP2): 112 ML
BH CV ECHO MEAS - EDV(MOD-SP4): 88.1 ML
BH CV ECHO MEAS - EF(MOD-BP): 69.2 %
BH CV ECHO MEAS - EF(MOD-SP2): 72.1 %
BH CV ECHO MEAS - EF(MOD-SP4): 67.5 %
BH CV ECHO MEAS - ESV(CUBED): 29.8 ML
BH CV ECHO MEAS - ESV(MOD-SP2): 31.2 ML
BH CV ECHO MEAS - ESV(MOD-SP4): 28.6 ML
BH CV ECHO MEAS - FS: 39.2 %
BH CV ECHO MEAS - IVS/LVPW: 0.89 CM
BH CV ECHO MEAS - IVSD: 0.8 CM
BH CV ECHO MEAS - LA DIMENSION: 3.8 CM
BH CV ECHO MEAS - LAT PEAK E' VEL: 14.4 CM/SEC
BH CV ECHO MEAS - LV DIASTOLIC VOL/BSA (35-75): 44.3 CM2
BH CV ECHO MEAS - LV MASS(C)D: 151.8 GRAMS
BH CV ECHO MEAS - LV MAX PG: 5.1 MMHG
BH CV ECHO MEAS - LV MEAN PG: 3 MMHG
BH CV ECHO MEAS - LV SYSTOLIC VOL/BSA (12-30): 14.4 CM2
BH CV ECHO MEAS - LV V1 MAX: 113 CM/SEC
BH CV ECHO MEAS - LV V1 VTI: 26.9 CM
BH CV ECHO MEAS - LVIDD: 5.1 CM
BH CV ECHO MEAS - LVIDS: 3.1 CM
BH CV ECHO MEAS - LVOT AREA: 4.9 CM2
BH CV ECHO MEAS - LVOT DIAM: 2.5 CM
BH CV ECHO MEAS - LVPWD: 0.9 CM
BH CV ECHO MEAS - MED PEAK E' VEL: 10.3 CM/SEC
BH CV ECHO MEAS - MR MAX PG: 56.7 MMHG
BH CV ECHO MEAS - MR MAX VEL: 376.5 CM/SEC
BH CV ECHO MEAS - MV A MAX VEL: 65.6 CM/SEC
BH CV ECHO MEAS - MV DEC SLOPE: 516 CM/SEC2
BH CV ECHO MEAS - MV E MAX VEL: 110 CM/SEC
BH CV ECHO MEAS - MV E/A: 1.68
BH CV ECHO MEAS - MV P1/2T: 69.2 MSEC
BH CV ECHO MEAS - MVA(P1/2T): 3.2 CM2
BH CV ECHO MEAS - PA V2 MAX: 112.5 CM/SEC
BH CV ECHO MEAS - RAP SYSTOLE: 5 MMHG
BH CV ECHO MEAS - RV MAX PG: 2.6 MMHG
BH CV ECHO MEAS - RV V1 MAX: 80.5 CM/SEC
BH CV ECHO MEAS - RVDD: 2.7 CM
BH CV ECHO MEAS - RVSP: 19 MMHG
BH CV ECHO MEAS - SI(MOD-SP2): 40.7 ML/M2
BH CV ECHO MEAS - SI(MOD-SP4): 30 ML/M2
BH CV ECHO MEAS - SV(LVOT): 132 ML
BH CV ECHO MEAS - SV(MOD-SP2): 80.8 ML
BH CV ECHO MEAS - SV(MOD-SP4): 59.5 ML
BH CV ECHO MEAS - TAPSE (>1.6): 2.8 CM
BH CV ECHO MEAS - TR MAX PG: 14 MMHG
BH CV ECHO MEAS - TR MAX VEL: 187 CM/SEC
BH CV ECHO MEASUREMENTS AVERAGE E/E' RATIO: 8.91
BH CV XLRA - RV BASE: 3.7 CM
LEFT ATRIUM VOLUME INDEX: 42.1 ML/M2
LEFT ATRIUM VOLUME: 83.8 ML
MAXIMAL PREDICTED HEART RATE: 191 BPM
STRESS TARGET HR: 162 BPM

## 2023-02-22 ENCOUNTER — TELEPHONE (OUTPATIENT)
Dept: CARDIOLOGY | Facility: CLINIC | Age: 28
End: 2023-02-22
Payer: COMMERCIAL

## 2023-02-22 NOTE — TELEPHONE ENCOUNTER
The patient is called to remind her to do her ordered labs tomorrow prior to her appointment with Dr. Dunbar.  She said she is not sure she will be able to come early to do them but she will come to the appointment.  Bianka Osborn MA

## 2023-02-23 ENCOUNTER — OFFICE VISIT (OUTPATIENT)
Dept: CARDIOLOGY | Facility: CLINIC | Age: 28
End: 2023-02-23
Payer: COMMERCIAL

## 2023-02-23 ENCOUNTER — LAB (OUTPATIENT)
Dept: LAB | Facility: HOSPITAL | Age: 28
End: 2023-02-23
Payer: COMMERCIAL

## 2023-02-23 VITALS
HEIGHT: 65 IN | DIASTOLIC BLOOD PRESSURE: 60 MMHG | OXYGEN SATURATION: 98 % | BODY MASS INDEX: 34.16 KG/M2 | HEART RATE: 71 BPM | SYSTOLIC BLOOD PRESSURE: 124 MMHG | WEIGHT: 205 LBS

## 2023-02-23 DIAGNOSIS — R00.2 PALPITATIONS: ICD-10-CM

## 2023-02-23 DIAGNOSIS — R93.1 ABNORMAL ECHOCARDIOGRAM: ICD-10-CM

## 2023-02-23 DIAGNOSIS — R53.83 FATIGUE, UNSPECIFIED TYPE: ICD-10-CM

## 2023-02-23 DIAGNOSIS — E66.9 CLASS 1 OBESITY WITHOUT SERIOUS COMORBIDITY WITH BODY MASS INDEX (BMI) OF 34.0 TO 34.9 IN ADULT, UNSPECIFIED OBESITY TYPE: ICD-10-CM

## 2023-02-23 DIAGNOSIS — Z82.49 FAMILY HISTORY OF PREMATURE CAD: ICD-10-CM

## 2023-02-23 DIAGNOSIS — R06.09 DYSPNEA ON EXERTION: Primary | ICD-10-CM

## 2023-02-23 DIAGNOSIS — Q24.9 CONGENITAL HEART DISEASE: Chronic | ICD-10-CM

## 2023-02-23 DIAGNOSIS — R06.09 DYSPNEA ON EXERTION: ICD-10-CM

## 2023-02-23 LAB
ANION GAP SERPL CALCULATED.3IONS-SCNC: 9.2 MMOL/L (ref 5–15)
BUN SERPL-MCNC: 10 MG/DL (ref 6–20)
BUN/CREAT SERPL: 14.9 (ref 7–25)
CALCIUM SPEC-SCNC: 9.4 MG/DL (ref 8.6–10.5)
CHLORIDE SERPL-SCNC: 106 MMOL/L (ref 98–107)
CHOLEST SERPL-MCNC: 204 MG/DL (ref 0–200)
CO2 SERPL-SCNC: 24.8 MMOL/L (ref 22–29)
CREAT SERPL-MCNC: 0.67 MG/DL (ref 0.57–1)
EGFRCR SERPLBLD CKD-EPI 2021: 121.5 ML/MIN/1.73
GLUCOSE SERPL-MCNC: 92 MG/DL (ref 65–99)
HDLC SERPL-MCNC: 56 MG/DL (ref 40–60)
LDLC SERPL CALC-MCNC: 128 MG/DL (ref 0–100)
LDLC/HDLC SERPL: 2.25 {RATIO}
NT-PROBNP SERPL-MCNC: 14.6 PG/ML (ref 0–450)
POTASSIUM SERPL-SCNC: 3.9 MMOL/L (ref 3.5–5.2)
SODIUM SERPL-SCNC: 140 MMOL/L (ref 136–145)
TRIGL SERPL-MCNC: 110 MG/DL (ref 0–150)
TSH SERPL DL<=0.05 MIU/L-ACNC: 0.81 UIU/ML (ref 0.27–4.2)
VLDLC SERPL-MCNC: 20 MG/DL (ref 5–40)

## 2023-02-23 PROCEDURE — 84443 ASSAY THYROID STIM HORMONE: CPT

## 2023-02-23 PROCEDURE — 36415 COLL VENOUS BLD VENIPUNCTURE: CPT

## 2023-02-23 PROCEDURE — 80048 BASIC METABOLIC PNL TOTAL CA: CPT

## 2023-02-23 PROCEDURE — 99214 OFFICE O/P EST MOD 30 MIN: CPT | Performed by: HOSPITALIST

## 2023-02-23 PROCEDURE — 80061 LIPID PANEL: CPT

## 2023-02-23 PROCEDURE — 83695 ASSAY OF LIPOPROTEIN(A): CPT

## 2023-02-23 PROCEDURE — 83880 ASSAY OF NATRIURETIC PEPTIDE: CPT

## 2023-02-23 PROCEDURE — 93000 ELECTROCARDIOGRAM COMPLETE: CPT | Performed by: HOSPITALIST

## 2023-02-23 RX ORDER — CLONAZEPAM 0.5 MG/1
0.5 TABLET ORAL AS NEEDED
COMMUNITY
Start: 2023-02-14

## 2023-02-23 RX ORDER — PAROXETINE 10 MG/1
10 TABLET, FILM COATED ORAL EVERY MORNING
COMMUNITY
Start: 2023-02-14

## 2023-02-23 NOTE — PROGRESS NOTES
"Reason For Visit:  Follow-up dyspnea and fatigue    Subjective        Katrin Wong is a 27 y.o. female with a PMH of anxiety and prior bariatric surgery who presents for follow-up of dyspnea and fatigue.    Patient was initially evaluated by me 2/8 with symptoms of extreme fatigue and shortness of breath as well as palpitations that had been present for a couple of weeks following having the flu.  She had been seen in an outside ED who recommended that she follow-up with cardiology.  An echocardiogram was ordered that demonstrated left atrial enlargement with normal biventricular systolic function.  A cardiac monitor was ordered.    Patient overall reports feeling much better today.  She has followed up with her PCP where she was diagnosed with elevated insulin levels and hypoglycemia; she was started on metformin and reports that this has helped.  She is pending a thyroid ultrasound at [facility].  She also is being treated for anxiety that has helped her feel much better.  She still gets palpitations that she feels are triggered primarily by stress/anxiety.  She wakes up with chest pressure in the morning associated with palpitations that improves after she gets up for the day.  She otherwise has not had significant chest pain, shortness of breath, lightheadedness, or lower extremity edema.  She is still wearing her cardiac monitor.    Of note, the patient's mother is present today and mentions that the patient had a \"hole in her heart\" when she was born.  She cannot recall the exact diagnosis, but she states that an ASD does sound familiar.    ROS: Pertinent positives and negatives are included above.    Pertinent past medical, surgical, family, and social history were reviewed and updated in the EMR.      Current Outpatient Medications:   •  clonazePAM (KlonoPIN) 0.5 MG tablet, Take 0.5 mg by mouth As Needed., Disp: , Rfl:   •  metFORMIN (GLUCOPHAGE) 500 MG tablet, Take 500 mg by mouth 2 (Two) Times a Day With " "Meals., Disp: , Rfl:   •  PARoxetine (PAXIL) 10 MG tablet, Take 10 mg by mouth Every Morning., Disp: , Rfl:   •  vitamin D3 125 MCG (5000 UT) capsule capsule, Take 5,000 Units by mouth Daily., Disp: , Rfl:      Objective   Vital Signs:  /60   Pulse 71   Ht 165.1 cm (65\")   Wt 93 kg (205 lb)   SpO2 98%   BMI 34.11 kg/m²   Estimated body mass index is 34.11 kg/m² as calculated from the following:    Height as of this encounter: 165.1 cm (65\").    Weight as of this encounter: 93 kg (205 lb).      Constitutional:       Appearance: Healthy appearance. Not in distress.   Neck:      Vascular: JVD normal.   Pulmonary:      Effort: Pulmonary effort is normal.      Breath sounds: Normal breath sounds.   Cardiovascular:      Normal rate. Regular rhythm.      Murmurs: There is no murmur.      No gallop. No click. No rub.   Edema:     Peripheral edema absent.   Abdominal:      General: There is no distension.      Palpations: Abdomen is soft.      Tenderness: There is no abdominal tenderness.   Skin:     General: Skin is warm and dry.   Neurological:      Mental Status: Alert and oriented to person, place and time.        Result Review :  The following data was reviewed by: Osmel Dunbar MD on 02/23/2023:  CMP   CMP    CMP 2/3/23 2/8/23 2/23/23   Glucose   92   Glucose 92 85    BUN 12 10 10   Creatinine 0.51 (A) 0.58 (A) 0.67   eGFR Non African Am >60.0 >60.0    eGFR African Am >60.0 >60.0    eGFR   121.5   Sodium 140 141 140   Potassium 3.6 3.1 (A) 3.9   Chloride 105 108 (A) 106   Calcium 8.6 7.9 (A) 9.4   Total Protein 7.0 6.5    Albumin 3.8 3.6    Total Bilirubin 0.3 0.2    Alkaline Phosphatase 67 55    AST (SGOT) 22 18    ALT (SGPT) 43 35    BUN/Creatinine Ratio 23.5 17.2 14.9   Anion Gap 8 10 9.2   (A) Abnormal value       Comments are available for some flowsheets but are not being displayed.             Lipid Panel   Lipid Panel    Lipid Panel 2/23/23   Total Cholesterol 204 (A)   Triglycerides 110   HDL " Cholesterol 56   VLDL Cholesterol 20   LDL Cholesterol  128 (A)   LDL/HDL Ratio 2.25   (A) Abnormal value            TSH   TSH    TSH 2/23/23   TSH 0.812           BNP   Lab Results - Last 18 Months   Lab Units 06/14/22  1851   BNP pg/mL 29     Pro-BNP   Lab Results - Last 18 Months   Lab Units 02/23/23  1250   PROBNP pg/mL 14.6     Lipoprotein a   Lab Results - Last 18 Months   Lab Units 02/23/23  1250   LIPOPROTEIN (A) nmol/L 71.4          ECG 12 Lead    Date/Time: 2/23/2023 4:03 PM  Performed by: Osmel Dunbar MD  Authorized by: Osmel Dunbar MD   Comparison: compared with previous ECG from 2/8/2023  Comparison to previous ECG: iRBBB no longer present  Rhythm: sinus rhythm    Clinical impression: normal ECG           Adult Transthoracic Echo Complete w/ Color, Spectral and Contrast if necessary per protocol (02/17/2023 09:18)  •  Left ventricular systolic function is normal. Left ventricular ejection fraction appears to be 66 - 70%.  •  Left ventricular diastolic function was normal.  •  Normal right ventricular cavity size and systolic function noted.  •  The left atrial cavity is mild to moderately dilated.  •  There is no hemodynamically significant (more than mild) valve disease.  •  Possible abnormality versus side-lobe artiact in liver that is not well characterized as described; consider dedicated ultrasound for further evaluation.    CT Abdomen Pelvis With Contrast (02/09/2023 01:11 EST)  Abdomen and Pelvis   The liver, spleen, pancreas, both adrenal glands and both kidneys are   unremarkable. No ascites or upper abdominal masses are seen. No   para-aortic lymphadenopathy or retroperitoneal mass lesions are noted.   The abdominal aorta is normal in caliber.     The bowel pattern is nonobstructive and nonspecific.     There are 2 collapsed left corpus luteal cyst identified. They measure   2 cm and 1.2 cm.     The urinary bladder is grossly unremarkable.     No aggressive osseous lesion identified.  "    Impression:     No acute abnormality..          Assessment and Plan   Diagnoses and all orders for this visit:    1. Dyspnea on exertion (Primary)  Now significantly improved.  Previously concern for CHF, but work-up reassuring.  Suspect this may have been residual related to influenza as well as other medical issues that are being addressed by primary care.    2. Palpitations  Based on developing history, likely significant anxiety component.  Cardiac monitor is pending for evaluation of arrhythmias.    3. Congenital heart disease  Patient reports history of \"hole in heart\" and does have left atrial dilation on echocardiogram that likely fits with this.  CTM for now and may warrant further imaging evaluation in the future.    4. Family History of Premature CAD  High normal (not elevated) lipoprotein a. Elevated LDL although does not meet criteria for lipid lowering therapy at current age. Primary focus on risk factor modification including BP control, maintaining healthy weight, and avoiding smoking.    5. Class 1 obesity w/o serious comorbidity w/ BMI 34-34.9  S/p Bariatric surgery in the past. Encourage activity and weight loww.     6. Abnormal Echocardiogram  Mostly reassuring echocardiogram with LA dilation possibly related to a L->R intracardiac shunt with increased LA volume (suspicious for ASD, which patient's mom reports possible history of). Also an incompletely visualized possible liver lesion; I suspect that this was side lobe artifact around diaphragm with respiratory variation. I did discuss with radiology and will obtain recent contrast CT abdomen for review/comparison (report indicates unremarkable liver and no abdominal masses, and this would have been a more thorough assessment than the limited echo images).    Other orders  -     ECG 12 Lead      Follow Up   Return in about 8 weeks (around 4/20/2023).  Patient was given instructions and counseling regarding her condition or for health " maintenance advice. Please see specific information pulled into the AVS if appropriate.       EMR Dragon/Transcription disclaimer: Much of this encounter note is an electronic transcription/translation of spoken language to printed text. The electronic translation of spoken language may permit erroneous, or at times, nonsensical words or phrases to be inadvertently transcribed; although I have reviewed the note for such errors, some may still exist.

## 2023-02-24 ENCOUNTER — TELEPHONE (OUTPATIENT)
Dept: CARDIOLOGY | Facility: CLINIC | Age: 28
End: 2023-02-24
Payer: COMMERCIAL

## 2023-02-24 LAB — LPA SERPL-SCNC: 71.4 NMOL/L

## 2023-02-24 NOTE — TELEPHONE ENCOUNTER
Called medical records @ [facility] in [place], [place] and requested disc for 2/9/23 abd/pelvic CT to be mailed to us.  They will send. Bianka Osborn MA

## 2023-02-24 NOTE — TELEPHONE ENCOUNTER
----- Message from Osmel Dunbar MD sent at 2/24/2023  1:08 PM CST -----  Please notify Ms. Wong of her lab results, which are overall reassuring. She has a mildly elevated LDL and cholesterol, but these are not high enough to warrant any treatment at this time; I would recommend good lifestyle measures to lower future heart disease risk including regular exercise, weight loss, and avoiding smoking. Thank you.

## 2023-02-24 NOTE — TELEPHONE ENCOUNTER
The patient is called and notified of her lab results and she voices understanding.  Bianka Osborn MA

## 2023-02-24 NOTE — TELEPHONE ENCOUNTER
Caller: Katrin Wong    Relationship: Self    Best call back number: [phone]    What was the call regarding: PT NEEDS A WORK EXCUSE FOR HER APPT YESTERDAY - SHE REPORTED SHE SPOKE WITH [employer] WHO SAID THEY WOULD PUT IT IN MYCHART BUT THEY NEVER DID    PT ALSO REPORTED SHE AND DR. LEGGETT DISCUSSED AN US OF HER LIVER, SHE WAS WONDERING IF THIS HAD ANY PROGRESS YET    Do you require a callback: YES

## 2023-02-24 NOTE — TELEPHONE ENCOUNTER
----- Message from Osmel Dunbar MD sent at 2/24/2023 12:08 PM CST -----  She had a CT abdomen w/ contrast done at another ED recently. Can you try to get those images?

## 2023-03-07 ENCOUNTER — TELEPHONE (OUTPATIENT)
Dept: CARDIOLOGY | Facility: CLINIC | Age: 28
End: 2023-03-07
Payer: COMMERCIAL

## 2023-03-07 NOTE — TELEPHONE ENCOUNTER
Disc is received from [facility] in [place], [place] and given to Dr. Dunbar.  Bianka Osborn MA

## 2023-03-10 ENCOUNTER — TELEPHONE (OUTPATIENT)
Dept: CARDIOLOGY | Facility: CLINIC | Age: 28
End: 2023-03-10
Payer: COMMERCIAL

## 2023-03-10 NOTE — TELEPHONE ENCOUNTER
The patient is called and notified of her holter result and she voices understanding.  Bianka Osborn MA   <--- Click to Launch ICDx for PreOp, PostOp and Procedure

## 2023-03-10 NOTE — TELEPHONE ENCOUNTER
----- Message from Osmel Dunbar MD sent at 3/10/2023 12:18 PM CST -----  Please notify Ms. Wong of her normal and reassuring monitor result. Thank you.

## 2024-09-10 ENCOUNTER — TELEPHONE (OUTPATIENT)
Dept: CARDIOLOGY | Facility: CLINIC | Age: 29
End: 2024-09-10
Payer: COMMERCIAL

## 2024-09-10 NOTE — TELEPHONE ENCOUNTER
Caller: Katrin Wong    Relationship to patient: Self    Best call back number: [phone]    Chief complaint: LIGHT HEADED DIZZY AND HIGH BP     Type of visit: 1 YEAR FU FOR HOLE IN HEART     Requested date: ASAP     If rescheduling, when is the original appointment: N/A     Additional notes:N/A

## 2024-09-11 NOTE — TELEPHONE ENCOUNTER
I called pt and made appt for 9/17 at 2:30.  She is going to see if she can get off work early to come to the appt.  [facility]  I told her that Dr. Dunbar did not have too many openings to get her in on a Monday or Wednesday at 3.  I think the first available for that was 10/30.  I asked her if she could see a NP, she said she would rather see Dr. Dunbar.  She took the appt and said she will call if she can not make it.  Mane Phillips, CMA

## 2025-03-21 ENCOUNTER — TELEPHONE (OUTPATIENT)
Dept: BARIATRICS/WEIGHT MGMT | Facility: CLINIC | Age: 30
End: 2025-03-21
Payer: COMMERCIAL

## 2025-07-24 ENCOUNTER — TELEPHONE (OUTPATIENT)
Dept: NEUROLOGY | Facility: CLINIC | Age: 30
End: 2025-07-24

## (undated) DEVICE — MAT PREVALON MOBL TRANSFR AIR W/PAD REPROC 39X81IN

## (undated) DEVICE — VESSEL SEALER EXTEND: Brand: ENDOWRIST

## (undated) DEVICE — BANDAGE,GAUZE,BULKEE II,4.5"X4.1YD,STRL: Brand: MEDLINE

## (undated) DEVICE — Device: Brand: DEFENDO AIR/WATER/SUCTION AND BIOPSY VALVE

## (undated) DEVICE — PK TURNOVER RM ADV

## (undated) DEVICE — FLEX TIP DUPLO SPRAYER

## (undated) DEVICE — NDL HYPO PRECISIONGLIDE REG 22G 1 1/2

## (undated) DEVICE — ENDOGATOR AUXILIARY WATER JET CONNECTOR: Brand: ENDOGATOR

## (undated) DEVICE — CANNULA SEAL

## (undated) DEVICE — TBG SMPL FLTR LINE NASL 02/C02 A/ BX/100

## (undated) DEVICE — ANTIBACTERIAL UNDYED BRAIDED (POLYGLACTIN 910), SYNTHETIC ABSORBABLE SUTURE: Brand: COATED VICRYL

## (undated) DEVICE — 2, DISPOSABLE SUCTION/IRRIGATOR WITHOUT DISPOSABLE TIP: Brand: STRYKEFLOW

## (undated) DEVICE — VISIGI 3D®  CALIBRATION SYSTEM  SIZE 40FR STD W/ BULB: Brand: BOEHRINGER® VISIGI 3D™ SLEEVE GASTRECTOMY CALIBRATION SYSTEM, SIZE 40FR W/BULB

## (undated) DEVICE — ENDOPATH XCEL WITH OPTIVIEW TECHNOLOGY BLADELESS TROCARS WITH STABILITY SLEEVES: Brand: ENDOPATH XCEL OPTIVIEW

## (undated) DEVICE — GLV SURG BIOGEL M LTX PF 8

## (undated) DEVICE — SENSR O2 OXIMAX FNGR A/ 18IN NONSTR

## (undated) DEVICE — 30977 SEE SHARP - ENHANCED INTRAOPERATIVE LAPAROSCOPE CLEANING & DEFOGGING: Brand: 30977 SEE SHARP - ENHANCED INTRAOPERATIVE LAPAROSCOPE CLEANING & DEFOGGING

## (undated) DEVICE — SEAL

## (undated) DEVICE — THE CHANNEL CLEANING BRUSH IS A NYLON FLEXI BRUSH ATTACHED TO A FLEXIBLE PLASTIC SHEATH DESIGNED TO SAFELY REMOVE DEBRIS FROM FLEXIBLE ENDOSCOPES.

## (undated) DEVICE — SUT VIC 0 SUTUPAK TIES 18IN J906G

## (undated) DEVICE — FRCP BX RADJAW4 NDL 2.8 240 STD OG

## (undated) DEVICE — CUFF,BP,DISP,1 TUBE,ADULT,HP: Brand: MEDLINE

## (undated) DEVICE — SUT MNCRYL 4/0 PS2 27IN UD MCP426H

## (undated) DEVICE — DAVINCI: Brand: MEDLINE INDUSTRIES, INC.

## (undated) DEVICE — SKIN AFFIX SURG ADHESIVE 72/CS 0.55ML: Brand: MEDLINE

## (undated) DEVICE — SYS CLOSE PORTII CARTR/THOMASN XL

## (undated) DEVICE — BNDR ABD 4PANEL 12IN 74TO85IN

## (undated) DEVICE — ARM DRAPE

## (undated) DEVICE — BLADELESS OBTURATOR: Brand: WECK VISTA

## (undated) DEVICE — CONMED SCOPE SAVER BITE BLOCK, 20X27 MM: Brand: SCOPE SAVER

## (undated) DEVICE — STAPLER 60: Brand: SUREFORM

## (undated) DEVICE — ST TBG AIRSEAL FLTR TRI LUM

## (undated) DEVICE — REDUCER: Brand: ENDOWRIST